# Patient Record
Sex: FEMALE | Race: WHITE | Employment: OTHER | URBAN - METROPOLITAN AREA
[De-identification: names, ages, dates, MRNs, and addresses within clinical notes are randomized per-mention and may not be internally consistent; named-entity substitution may affect disease eponyms.]

---

## 2018-10-06 ENCOUNTER — HOSPITAL ENCOUNTER (OUTPATIENT)
Age: 63
Discharge: HOME | End: 2018-10-06
Payer: COMMERCIAL

## 2018-10-06 LAB
ALBUMIN SERPL-MCNC: 4 G/DL (ref 3.5–5.2)
ALP BLD-CCNC: 153 U/L (ref 35–104)
ALT SERPL-CCNC: 26 U/L (ref 0–32)
ANION GAP SERPL CALCULATED.3IONS-SCNC: 12 MMOL/L (ref 7–16)
AST SERPL-CCNC: 19 U/L (ref 0–31)
BILIRUB SERPL-MCNC: <0.2 MG/DL (ref 0–1.2)
BUN BLDV-MCNC: 18 MG/DL (ref 8–23)
CALCIUM SERPL-MCNC: 9.3 MG/DL (ref 8.6–10.2)
CHLORIDE BLD-SCNC: 106 MMOL/L (ref 98–107)
CHOLESTEROL, FASTING: 167 MG/DL (ref 0–199)
CO2: 22 MMOL/L (ref 22–29)
CREAT SERPL-MCNC: 1 MG/DL (ref 0.5–1)
GFR AFRICAN AMERICAN: >60
GFR NON-AFRICAN AMERICAN: 56 ML/MIN/1.73
GLUCOSE BLD-MCNC: 285 MG/DL (ref 74–109)
HBA1C MFR BLD: 8.8 % (ref 4–5.6)
HDLC SERPL-MCNC: 34 MG/DL
LDL CHOLESTEROL CALCULATED: 86 MG/DL (ref 0–99)
POTASSIUM SERPL-SCNC: 5.3 MMOL/L (ref 3.5–5)
SODIUM BLD-SCNC: 140 MMOL/L (ref 132–146)
T4 FREE: 1.51 NG/DL (ref 0.93–1.7)
TOTAL PROTEIN: 6.9 G/DL (ref 6.4–8.3)
TRIGLYCERIDE, FASTING: 237 MG/DL (ref 0–149)
TSH SERPL DL<=0.05 MIU/L-ACNC: 0.35 UIU/ML (ref 0.27–4.2)
VLDLC SERPL CALC-MCNC: 47 MG/DL

## 2018-10-06 PROCEDURE — 83721 ASSAY OF BLOOD LIPOPROTEIN: CPT

## 2018-10-06 PROCEDURE — 80053 COMPREHEN METABOLIC PANEL: CPT

## 2018-10-06 PROCEDURE — 80061 LIPID PANEL: CPT

## 2018-10-06 PROCEDURE — 83036 HEMOGLOBIN GLYCOSYLATED A1C: CPT

## 2018-10-06 PROCEDURE — 36415 COLL VENOUS BLD VENIPUNCTURE: CPT

## 2018-10-06 PROCEDURE — 84443 ASSAY THYROID STIM HORMONE: CPT

## 2018-10-06 PROCEDURE — 84439 ASSAY OF FREE THYROXINE: CPT

## 2018-10-09 LAB
Lab: NORMAL
REPORT: NORMAL
THIS TEST SENT TO: NORMAL

## 2019-01-12 ENCOUNTER — HOSPITAL ENCOUNTER (OUTPATIENT)
Age: 64
Discharge: HOME | End: 2019-01-12
Payer: COMMERCIAL

## 2019-01-12 LAB
ALBUMIN SERPL-MCNC: 4.4 G/DL (ref 3.5–5.2)
ALP BLD-CCNC: 128 U/L (ref 35–104)
ALT SERPL-CCNC: 22 U/L (ref 0–32)
ANION GAP SERPL CALCULATED.3IONS-SCNC: 8 MMOL/L (ref 7–16)
AST SERPL-CCNC: 14 U/L (ref 0–31)
BILIRUB SERPL-MCNC: 0.3 MG/DL (ref 0–1.2)
BUN BLDV-MCNC: 22 MG/DL (ref 8–23)
CALCIUM SERPL-MCNC: 9.6 MG/DL (ref 8.6–10.2)
CHLORIDE BLD-SCNC: 106 MMOL/L (ref 98–107)
CHOLESTEROL, TOTAL: 184 MG/DL (ref 0–199)
CO2: 25 MMOL/L (ref 22–29)
CREAT SERPL-MCNC: 1 MG/DL (ref 0.5–1)
GFR AFRICAN AMERICAN: >60
GFR NON-AFRICAN AMERICAN: 56 ML/MIN/1.73
GLUCOSE BLD-MCNC: 261 MG/DL (ref 74–99)
HBA1C MFR BLD: 8.6 % (ref 4–5.6)
HDLC SERPL-MCNC: 42 MG/DL
LDL CHOLESTEROL CALCULATED: 102 MG/DL (ref 0–99)
POTASSIUM SERPL-SCNC: 5.6 MMOL/L (ref 3.5–5)
SODIUM BLD-SCNC: 139 MMOL/L (ref 132–146)
T4 FREE: 1.24 NG/DL (ref 0.93–1.7)
TOTAL PROTEIN: 6.8 G/DL (ref 6.4–8.3)
TRIGL SERPL-MCNC: 200 MG/DL (ref 0–149)
TSH SERPL DL<=0.05 MIU/L-ACNC: 1.83 UIU/ML (ref 0.27–4.2)
VLDLC SERPL CALC-MCNC: 40 MG/DL

## 2019-01-12 PROCEDURE — 84443 ASSAY THYROID STIM HORMONE: CPT

## 2019-01-12 PROCEDURE — 36415 COLL VENOUS BLD VENIPUNCTURE: CPT

## 2019-01-12 PROCEDURE — 80053 COMPREHEN METABOLIC PANEL: CPT

## 2019-01-12 PROCEDURE — 83721 ASSAY OF BLOOD LIPOPROTEIN: CPT

## 2019-01-12 PROCEDURE — 83036 HEMOGLOBIN GLYCOSYLATED A1C: CPT

## 2019-01-12 PROCEDURE — 80061 LIPID PANEL: CPT

## 2019-01-12 PROCEDURE — 84439 ASSAY OF FREE THYROXINE: CPT

## 2019-01-16 LAB
Lab: NORMAL
REPORT: NORMAL
THIS TEST SENT TO: NORMAL

## 2019-04-04 VITALS
BODY MASS INDEX: 32.96 KG/M2 | HEIGHT: 63 IN | DIASTOLIC BLOOD PRESSURE: 80 MMHG | SYSTOLIC BLOOD PRESSURE: 122 MMHG | HEART RATE: 76 BPM | WEIGHT: 186 LBS

## 2019-04-04 RX ORDER — ATORVASTATIN CALCIUM 40 MG/1
40 TABLET, FILM COATED ORAL DAILY
COMMUNITY
End: 2019-08-21 | Stop reason: SDUPTHER

## 2019-04-04 RX ORDER — GLIMEPIRIDE 4 MG/1
4 TABLET ORAL
COMMUNITY

## 2019-04-04 RX ORDER — TRAMADOL HYDROCHLORIDE 50 MG/1
50 TABLET ORAL
COMMUNITY
End: 2019-06-25

## 2019-04-04 RX ORDER — CHOLECALCIFEROL (VITAMIN D3) 1250 MCG
1 CAPSULE ORAL WEEKLY
COMMUNITY
End: 2020-04-28

## 2019-04-04 RX ORDER — METOPROLOL SUCCINATE 50 MG/1
50 TABLET, EXTENDED RELEASE ORAL DAILY
COMMUNITY
End: 2019-06-25

## 2019-04-04 RX ORDER — FERROUS SULFATE 325(65) MG
325 TABLET ORAL DAILY
COMMUNITY
End: 2019-06-25

## 2019-04-04 RX ORDER — PIOGLITAZONEHYDROCHLORIDE 15 MG/1
15 TABLET ORAL NIGHTLY
COMMUNITY

## 2019-04-04 RX ORDER — FLUTICASONE PROPIONATE 50 MCG
2 SPRAY, SUSPENSION (ML) NASAL DAILY
COMMUNITY
End: 2020-04-28

## 2019-04-27 ENCOUNTER — HOSPITAL ENCOUNTER (OUTPATIENT)
Age: 64
Discharge: HOME | End: 2019-04-27
Payer: COMMERCIAL

## 2019-04-27 LAB
ALBUMIN SERPL-MCNC: 4.2 G/DL (ref 3.5–5.2)
ALP BLD-CCNC: 127 U/L (ref 35–104)
ALT SERPL-CCNC: 25 U/L (ref 0–32)
ANION GAP SERPL CALCULATED.3IONS-SCNC: 13 MMOL/L (ref 7–16)
AST SERPL-CCNC: 18 U/L (ref 0–31)
BILIRUB SERPL-MCNC: 0.2 MG/DL (ref 0–1.2)
BUN BLDV-MCNC: 21 MG/DL (ref 8–23)
CALCIUM SERPL-MCNC: 9.3 MG/DL (ref 8.6–10.2)
CHLORIDE BLD-SCNC: 105 MMOL/L (ref 98–107)
CHOLESTEROL, FASTING: 175 MG/DL (ref 0–199)
CO2: 24 MMOL/L (ref 22–29)
CREAT SERPL-MCNC: 1.1 MG/DL (ref 0.5–1)
GFR AFRICAN AMERICAN: >60
GFR NON-AFRICAN AMERICAN: 50 ML/MIN/1.73
GLUCOSE BLD-MCNC: 161 MG/DL (ref 74–99)
HBA1C MFR BLD: 7.3 % (ref 4–5.6)
HDLC SERPL-MCNC: 47 MG/DL
LDL CHOLESTEROL CALCULATED: 98 MG/DL (ref 0–99)
POTASSIUM SERPL-SCNC: 4.9 MMOL/L (ref 3.5–5)
SODIUM BLD-SCNC: 142 MMOL/L (ref 132–146)
T4 FREE: 1.3 NG/DL (ref 0.93–1.7)
TOTAL PROTEIN: 6.8 G/DL (ref 6.4–8.3)
TRIGLYCERIDE, FASTING: 152 MG/DL (ref 0–149)
TSH SERPL DL<=0.05 MIU/L-ACNC: 0.34 UIU/ML (ref 0.27–4.2)
VLDLC SERPL CALC-MCNC: 30 MG/DL

## 2019-04-27 PROCEDURE — 84443 ASSAY THYROID STIM HORMONE: CPT

## 2019-04-27 PROCEDURE — 83036 HEMOGLOBIN GLYCOSYLATED A1C: CPT

## 2019-04-27 PROCEDURE — 80053 COMPREHEN METABOLIC PANEL: CPT

## 2019-04-27 PROCEDURE — 80061 LIPID PANEL: CPT

## 2019-04-27 PROCEDURE — 84439 ASSAY OF FREE THYROXINE: CPT

## 2019-04-27 PROCEDURE — 36415 COLL VENOUS BLD VENIPUNCTURE: CPT

## 2019-06-22 PROBLEM — E11.9 TYPE 2 DIABETES MELLITUS WITHOUT COMPLICATION (HCC): Status: ACTIVE | Noted: 2019-06-22

## 2019-06-22 PROBLEM — K21.9 GERD (GASTROESOPHAGEAL REFLUX DISEASE): Status: ACTIVE | Noted: 2019-06-22

## 2019-06-22 PROBLEM — E78.5 HYPERLIPIDEMIA: Status: ACTIVE | Noted: 2019-06-22

## 2019-06-22 NOTE — PROGRESS NOTES
19  Sharad Green : 1955 Sex: female  Age: 58 y.o. Chief Complaint   Patient presents with    Pre-op Exam       She is scheduled with Dr. Monique Holley to have total knee arthroplasty on 2019. This will be done at [facility].  Patient had previous knee arthroplasty and did well. She did have some problems with sedation after anesthesia. She has not had these problems previously. Patient denied nausea or vomiting after the surgery. Patient does easily exceed 4 metabolic equivalents of activity on a regular basis. With this activity she is not having symptoms of chest pain or chest pressure. She denies shortness of breath. She denies nausea, vomiting, diaphoresis or shortness of breath. Patient does not have any other anginal equivalents with activity. Most recent hemoglobin A1c was 7.3. Her perioperative diabetes medication is being handled by her endocrinologist Dr. Angela Monaco. Patient's blood pressure is well controlled here. She is not having tachycardia.       Review of Systems  PMH:  Health Maintenance:  Mammogram Screening - (2014)-slip given 2019  Bone Density Test Screening - (2011)  Sharadmarco Green  1955 Page #2  EGD - 2002 GREEN  2005 LUIS  Colonoscopy - () wnl  Mammogram - () wnl  Pneumonia Vaccination -   Influenza Vaccination - ()  Tdap - (2011)  Eye Exams - YEARLY MARISKI  Hepatitis B Series - YEARS AGO  Pelvic/Pap Exam - (2012) pap & hpv neg  Bone Density Test - (2011) normal but low normal bone density  Stress Test - (3/2016) LEXISCAN-NORMAL  Medical Problems:  Type 2 Diabetes - DIET CONTROLLED   BEGAN MEDS  COROLLO  Hypothyroidism -   Palpitations - FULL EVALUATION KALYAN - BEGAN METOPROLOL  STRESS TEST - ABNORMAL  HEART CATH  NORMAL [facility]  GERD - EGD  GREEN  EGD  LUIS- REFERRED BACK   Hyperlipidemia -  BEGAN STATINS  Seasonal Allergies - (2011) BEGAN FLONASE  Recurrent Sinusitis - NASAL SWAB TAKEN, MAY NEED IMMUNGLOBULIN STUDIES  Anxiety - (6/26/2013) BEGAN PAXIL  Depression, Reflux Disease  Osteoarthritis - (2017) KNEES- REFERRED TO BRIGIDO  Insomnia - (6/26/2013) BEGAN PAXIL-GOOD RESPONSE 7/31/13  Right Breast Swelling And Pain - NEGATIVE DIAGNOSTIC MAMMOGRAM/DR ARORA  Nausea/Vomiting/Diarrhea - PROBABLE SE FROM BYDUREON  Chest Pain - (2/17/2016) EKG/STRESS TEST/CHANGE TO METOPROLOL SUCCINATE  Kidney Disease - (2/1/2017) stable Cr 1.1 4/2019  Post-Op Anemia - (7/2017)  Tinnitus - REFERRED TO KATHERYN  Hearing Loss - (3/14/2018) KATHERYN  Surgical Hx:  Tubal Ligation  Endometrieosis - (1986) laparoscopic  Cholecystectomy - (1/2012)  Reviewed, no changes. FH:  Father:  Liver Cancer. Mother:  Esophagus Cancer, Non Insulin Dependent Diabetes, Thyroid Disease. Sister 1:  Insulin Dependent Diabetes. Reviewed, no changes. SH:  Marital: Legal Status: . Personal Habits: Cigarette Use: Former Cigarette Smoker 2 Packs Daily, Pack Years - 27, Quit -  18. Alcohol: Never used alcohol. Daily Caffeine: Consumes on average 1 soda per day. Exercise  Type: Walks 3 times a week. Reviewed, no changes. Date: 02/20/2019  Was the patient queried about smoking behavior? Yes   Does the patient currently smoke? Smoking: Patient is a former smoker.       Current Outpatient Medications:     ONETOUCH VERIO strip, , Disp: , Rfl:     ONETOUCH DELICA LANCETS 42W MISC, , Disp: , Rfl:     glimepiride (AMARYL) 4 MG tablet, Take 4 mg by mouth 2 times daily (before meals), Disp: , Rfl:     atorvastatin (LIPITOR) 40 MG tablet, Take 40 mg by mouth daily, Disp: , Rfl:     metFORMIN (GLUCOPHAGE) 1000 MG tablet, Take 1,000 mg by mouth 2 times daily (with meals), Disp: , Rfl:     Empagliflozin-Linagliptin (GLYXAMBI) 25-5 MG TABS, Take 1 tablet by mouth daily, Disp: , Rfl:     Cholecalciferol (VITAMIN D3) 40569 units CAPS, Take 1 capsule by mouth once a week, Disp: , Rfl:     mometasone [facility] HFA) 100 MCG/ACT AERO inhaler, Inhale 1 puff into the lungs 2 times daily, Disp: , Rfl:     fluticasone (FLONASE) 50 MCG/ACT nasal spray, 2 sprays by Each Nare route daily, Disp: , Rfl:     pioglitazone (ACTOS) 15 MG tablet, Take 15 mg by mouth daily, Disp: , Rfl:     metoprolol (LOPRESSOR) 50 MG tablet, , Disp: , Rfl:     PARoxetine (PAXIL) 40 MG tablet, 40 mg , Disp: , Rfl:     levothyroxine (SYNTHROID) 150 MCG tablet, Take 150 mcg by mouth Daily , Disp: , Rfl:     esomeprazole (NEXIUM) 40 MG capsule, Take 40 mg by mouth every morning (before breakfast). , Disp: , Rfl:     Calcium Carb-Cholecalciferol (CALCIUM + D3 PO), Take  by mouth., Disp: , Rfl:   Allergies   Allergen Reactions    Azithromycin     Pcn [Penicillins] Rash    Sulfa Antibiotics Rash    Zyrtec [Cetirizine] Rash       Past Medical History:   Diagnosis Date    Acid reflux disease     Anxiety     began paxil 2013    Breast swelling     right - neg diagnostic mammo    Chest pain     Depression     Endometriosis 1986    laparoscopic    GERD (gastroesophageal reflux disease)     Hearing loss     Hyperlipidemia     Hypothyroidism     Insomnia     Kidney disease     Nausea vomiting and diarrhea     Osteoarthritis 6/8/2011     ref to Jaime    Palpitations     Postoperative anemia     Recurrent sinusitis     Seasonal allergies     Tinnitus     Type 2 diabetes mellitus without complication (HCC)     Type II or unspecified type diabetes mellitus without mention of complication, not stated as uncontrolled      Past Surgical History:   Procedure Laterality Date    CARDIAC CATHETERIZATION      CHOLECYSTECTOMY  1/2012    COLONOSCOPY  2006    wnl    KNEE SURGERY      TUBAL LIGATION      UPPER GASTROINTESTINAL ENDOSCOPY       Family History   Problem Relation Age of Onset    Cancer Mother         esophageal    Diabetes Mother     Thyroid Disease Mother     Cancer Father         liver    Diabetes Sister Social History     Socioeconomic History    Marital status:      Spouse name: Not on file    Number of children: Not on file    Years of education: Not on file    Highest education level: Not on file   Occupational History    Not on file   Social Needs    Financial resource strain: Not on file    Food insecurity:     Worry: Not on file     Inability: Not on file    Transportation needs:     Medical: Not on file     Non-medical: Not on file   Tobacco Use    Smoking status: Former Smoker     Packs/day: 2.00     Years: 30.00     Pack years: 60.00     Start date:      Last attempt to quit: 1995     Years since quittin.6    Smokeless tobacco: Never Used   Substance and Sexual Activity    Alcohol use: Not Currently     Comment: rare    Drug use: Not on file    Sexual activity: Not on file   Lifestyle    Physical activity:     Days per week: Not on file     Minutes per session: Not on file    Stress: Not on file   Relationships    Social connections:     Talks on phone: Not on file     Gets together: Not on file     Attends Druze service: Not on file     Active member of club or organization: Not on file     Attends meetings of clubs or organizations: Not on file     Relationship status: Not on file    Intimate partner violence:     Fear of current or ex partner: Not on file     Emotionally abused: Not on file     Physically abused: Not on file     Forced sexual activity: Not on file   Other Topics Concern    Not on file   Social History Narrative    Not on file       Vitals:    19 1142   BP: 124/60   Pulse: 96   SpO2: 95%   Weight: 192 lb (87.1 kg)   Height: 5' 3\" (1.6 m)       Physical Exam  Objective    Exam:  Const: Appears healthy, well developed and well nourished. Appears obese. Eyes: EOMI in both eyes. PERRL. ENMT: External ears WNL. TM's intact and middle ear well aerated. External nose WNL. Moistness  and normal color of the nasal mucosae.  Septum is in the midline. Multiple missing teeth. Gums  appear healthy. Posterior pharynx shows clear postnasal drip, but no exudate, irritation or redness. Neck: Supple and symmetric. Palpation reveals no adenopathy. No masses appreciated. Thyroid  exhibits no nodule or thyromegaly. No JVD. Resp: Respirations are unlabored. Respiration rate is normal. Auscultate mildly decreased airflow. No  rales, rhonchi or wheezes appreciated over the lungs bilaterally. CV: Rhythm is regular. S1 is normal. S2 is normal. Carotids: no bruits. Abdominal aorta: not  palpable. Pedal pulses: 2+ and equal bilaterally. Extremities: No clubbing, cyanosis or edema. Abdomen: Bowel sounds are normoactive. Palpation reveals softness, with no CVA tenderness,  distension, organomegaly, rebound tenderness or tenderness. No abdominal masses palpable. No  palpable hepatosplenomegaly. Musculo: Walks with a normal gait. Upper Extremities: Pain especially with external rotation of the left  shoulder. No discomfort with palpation over the proximal muscles of the shoulder or hip girdles. Lower  Extremities: DJD changes of the lower extremities. Skin: Warm and dry without rash. Neuro: Alert and oriented x3. Mood is normal. Affect is normal. Speech is articulate and fluent. Reflexes: DTR's are intact bilaterally. Psych: Patient's attitude is cooperative. Patient's affect is appropriate. Judgement is realistic. Insight  is appropriate. Assessment and Plan:  Krysta Sumner was seen today for pre-op exam.    Diagnoses and all orders for this visit:    Gastroesophageal reflux disease without esophagitis    Type 2 diabetes mellitus without complication, without long-term current use of insulin ([facility] [address]  -     Cancel: Urinalysis with Microscopic; Future  -     POCT Urinalysis no Micro    Primary osteoarthritis involving multiple joints  -     EKG 12 lead; Future  -     MRSA SCREENING CULTURE ONLY; Future  -     Cancel: Urinalysis with Microscopic;  Future  -     EKG 12 lead    Mixed hyperlipidemia    The patient had an EKG here indicating no change from her previous tracing of 2017. MRSA screen was completed along with urinalysis. Patient is medically optimized for surgery. Forms are completed. Return in about 4 months (around 10/25/2019).       Seen By:  Patricia Werner MD

## 2019-06-25 ENCOUNTER — OFFICE VISIT (OUTPATIENT)
Dept: FAMILY MEDICINE CLINIC | Age: 64
End: 2019-06-25
Payer: COMMERCIAL

## 2019-06-25 ENCOUNTER — HOSPITAL ENCOUNTER (OUTPATIENT)
Age: 64
Discharge: HOME | End: 2019-06-27
Payer: COMMERCIAL

## 2019-06-25 VITALS
HEIGHT: 63 IN | OXYGEN SATURATION: 95 % | HEART RATE: 96 BPM | DIASTOLIC BLOOD PRESSURE: 60 MMHG | SYSTOLIC BLOOD PRESSURE: 124 MMHG | WEIGHT: 192 LBS | BODY MASS INDEX: 34.02 KG/M2

## 2019-06-25 DIAGNOSIS — K21.9 GASTROESOPHAGEAL REFLUX DISEASE WITHOUT ESOPHAGITIS: ICD-10-CM

## 2019-06-25 DIAGNOSIS — M15.9 PRIMARY OSTEOARTHRITIS INVOLVING MULTIPLE JOINTS: ICD-10-CM

## 2019-06-25 DIAGNOSIS — E11.9 TYPE 2 DIABETES MELLITUS WITHOUT COMPLICATION, WITHOUT LONG-TERM CURRENT USE OF INSULIN (HCC): ICD-10-CM

## 2019-06-25 DIAGNOSIS — E78.2 MIXED HYPERLIPIDEMIA: ICD-10-CM

## 2019-06-25 LAB
BILIRUBIN, POC: NORMAL
BLOOD URINE, POC: NORMAL
CLARITY, POC: YELLOW
COLOR, POC: CLEAR
GLUCOSE URINE, POC: 1000
KETONES, POC: NORMAL
LEUKOCYTE EST, POC: NORMAL
NITRITE, POC: NORMAL
PH, POC: 5.5
PROTEIN, POC: NORMAL
SPECIFIC GRAVITY, POC: 1.02
UROBILINOGEN, POC: 0.2

## 2019-06-25 PROCEDURE — 93000 ELECTROCARDIOGRAM COMPLETE: CPT | Performed by: INTERNAL MEDICINE

## 2019-06-25 PROCEDURE — 87081 CULTURE SCREEN ONLY: CPT

## 2019-06-25 PROCEDURE — 99214 OFFICE O/P EST MOD 30 MIN: CPT | Performed by: INTERNAL MEDICINE

## 2019-06-25 PROCEDURE — 81002 URINALYSIS NONAUTO W/O SCOPE: CPT | Performed by: INTERNAL MEDICINE

## 2019-06-25 RX ORDER — BLOOD SUGAR DIAGNOSTIC
STRIP MISCELLANEOUS
COMMUNITY
Start: 2019-06-22 | End: 2020-04-28

## 2019-06-25 RX ORDER — LANCETS 33 GAUGE
EACH MISCELLANEOUS
COMMUNITY
Start: 2019-06-22 | End: 2020-04-28

## 2019-06-25 RX ORDER — ERGOCALCIFEROL 1.25 MG/1
CAPSULE ORAL
COMMUNITY
Start: 2019-04-26 | End: 2019-06-25

## 2019-06-27 LAB — MRSA CULTURE ONLY: NORMAL

## 2019-07-12 ENCOUNTER — HOSPITAL ENCOUNTER (OUTPATIENT)
Age: 64
Discharge: HOME | End: 2019-07-14
Payer: COMMERCIAL

## 2019-07-12 ENCOUNTER — HOSPITAL ENCOUNTER (OUTPATIENT)
Age: 64
Discharge: HOME | End: 2019-07-14

## 2019-07-12 LAB
ABO/RH: NORMAL
ANION GAP SERPL CALCULATED.3IONS-SCNC: 13 MMOL/L (ref 7–16)
ANTIBODY SCREEN: NORMAL
BUN BLDV-MCNC: 20 MG/DL (ref 8–23)
CALCIUM SERPL-MCNC: 10 MG/DL (ref 8.6–10.2)
CHLORIDE BLD-SCNC: 104 MMOL/L (ref 98–107)
CO2: 24 MMOL/L (ref 22–29)
CREAT SERPL-MCNC: 1 MG/DL (ref 0.5–1)
GFR AFRICAN AMERICAN: >60
GFR NON-AFRICAN AMERICAN: 56 ML/MIN/1.73
GLUCOSE BLD-MCNC: 187 MG/DL (ref 74–99)
HCT VFR BLD CALC: 41.6 % (ref 34–48)
HEMOGLOBIN: 12.8 G/DL (ref 11.5–15.5)
MCH RBC QN AUTO: 27.2 PG (ref 26–35)
MCHC RBC AUTO-ENTMCNC: 30.8 % (ref 32–34.5)
MCV RBC AUTO: 88.5 FL (ref 80–99.9)
PDW BLD-RTO: 16.2 FL (ref 11.5–15)
PLATELET # BLD: 288 E9/L (ref 130–450)
PMV BLD AUTO: 11.8 FL (ref 7–12)
POTASSIUM SERPL-SCNC: 5 MMOL/L (ref 3.5–5)
RBC # BLD: 4.7 E12/L (ref 3.5–5.5)
SODIUM BLD-SCNC: 141 MMOL/L (ref 132–146)
WBC # BLD: 6.4 E9/L (ref 4.5–11.5)

## 2019-07-12 PROCEDURE — 86850 RBC ANTIBODY SCREEN: CPT

## 2019-07-12 PROCEDURE — 87081 CULTURE SCREEN ONLY: CPT

## 2019-07-12 PROCEDURE — 86900 BLOOD TYPING SEROLOGIC ABO: CPT

## 2019-07-12 PROCEDURE — 86901 BLOOD TYPING SEROLOGIC RH(D): CPT

## 2019-07-12 PROCEDURE — 80048 BASIC METABOLIC PNL TOTAL CA: CPT

## 2019-07-12 PROCEDURE — 87088 URINE BACTERIA CULTURE: CPT

## 2019-07-12 PROCEDURE — 85027 COMPLETE CBC AUTOMATED: CPT

## 2019-07-14 LAB
MRSA CULTURE ONLY: NORMAL
URINE CULTURE, ROUTINE: NORMAL

## 2019-07-24 ENCOUNTER — HOSPITAL ENCOUNTER (OUTPATIENT)
Age: 64
Discharge: HOME | End: 2019-07-24
Payer: COMMERCIAL

## 2019-07-24 ENCOUNTER — OFFICE VISIT (OUTPATIENT)
Dept: FAMILY MEDICINE CLINIC | Age: 64
End: 2019-07-24
Payer: COMMERCIAL

## 2019-07-24 VITALS
OXYGEN SATURATION: 98 % | HEART RATE: 111 BPM | WEIGHT: 189 LBS | HEIGHT: 63 IN | DIASTOLIC BLOOD PRESSURE: 80 MMHG | SYSTOLIC BLOOD PRESSURE: 122 MMHG | BODY MASS INDEX: 33.49 KG/M2

## 2019-07-24 DIAGNOSIS — F32.9 REACTIVE DEPRESSION: ICD-10-CM

## 2019-07-24 DIAGNOSIS — R50.9 FEVER, UNSPECIFIED FEVER CAUSE: Primary | ICD-10-CM

## 2019-07-24 DIAGNOSIS — R50.9 FEVER, UNSPECIFIED FEVER CAUSE: ICD-10-CM

## 2019-07-24 LAB
ALBUMIN SERPL-MCNC: 4.5 G/DL (ref 3.5–5.2)
ALP BLD-CCNC: 138 U/L (ref 35–104)
ALT SERPL-CCNC: 28 U/L (ref 0–32)
ANION GAP SERPL CALCULATED.3IONS-SCNC: 13 MMOL/L (ref 7–16)
AST SERPL-CCNC: 19 U/L (ref 0–31)
BILIRUB SERPL-MCNC: 0.3 MG/DL (ref 0–1.2)
BUN BLDV-MCNC: 18 MG/DL (ref 8–23)
CALCIUM SERPL-MCNC: 10.1 MG/DL (ref 8.6–10.2)
CHLORIDE BLD-SCNC: 106 MMOL/L (ref 98–107)
CO2: 23 MMOL/L (ref 22–29)
CREAT SERPL-MCNC: 1 MG/DL (ref 0.5–1)
GFR AFRICAN AMERICAN: >60
GFR NON-AFRICAN AMERICAN: 56 ML/MIN/1.73
GLUCOSE BLD-MCNC: 161 MG/DL (ref 74–99)
HCT VFR BLD CALC: 41.7 % (ref 34–48)
HEMOGLOBIN: 13.2 G/DL (ref 11.5–15.5)
MCH RBC QN AUTO: 27.8 PG (ref 26–35)
MCHC RBC AUTO-ENTMCNC: 31.7 % (ref 32–34.5)
MCV RBC AUTO: 87.8 FL (ref 80–99.9)
PDW BLD-RTO: 16.3 FL (ref 11.5–15)
PLATELET # BLD: 296 E9/L (ref 130–450)
PMV BLD AUTO: 11 FL (ref 7–12)
POTASSIUM SERPL-SCNC: 4.5 MMOL/L (ref 3.5–5)
RBC # BLD: 4.75 E12/L (ref 3.5–5.5)
SEDIMENTATION RATE, ERYTHROCYTE: 10 MM/HR (ref 0–20)
SODIUM BLD-SCNC: 142 MMOL/L (ref 132–146)
TOTAL PROTEIN: 7.4 G/DL (ref 6.4–8.3)
WBC # BLD: 7.3 E9/L (ref 4.5–11.5)

## 2019-07-24 PROCEDURE — 85027 COMPLETE CBC AUTOMATED: CPT

## 2019-07-24 PROCEDURE — 85651 RBC SED RATE NONAUTOMATED: CPT

## 2019-07-24 PROCEDURE — 36415 COLL VENOUS BLD VENIPUNCTURE: CPT

## 2019-07-24 PROCEDURE — 99214 OFFICE O/P EST MOD 30 MIN: CPT | Performed by: INTERNAL MEDICINE

## 2019-07-24 PROCEDURE — 80053 COMPREHEN METABOLIC PANEL: CPT

## 2019-07-24 RX ORDER — PAROXETINE HYDROCHLORIDE 40 MG/1
40 TABLET, FILM COATED ORAL EVERY MORNING
Qty: 30 TABLET | Refills: 1 | Status: SHIPPED | OUTPATIENT
Start: 2019-07-24 | End: 2019-08-21 | Stop reason: SDUPTHER

## 2019-07-25 DIAGNOSIS — K21.9 GASTROESOPHAGEAL REFLUX DISEASE WITHOUT ESOPHAGITIS: Primary | ICD-10-CM

## 2019-07-31 ENCOUNTER — HOSPITAL ENCOUNTER (OUTPATIENT)
Age: 64
Discharge: HOME | End: 2019-08-02
Payer: COMMERCIAL

## 2019-07-31 DIAGNOSIS — K21.9 GASTROESOPHAGEAL REFLUX DISEASE WITHOUT ESOPHAGITIS: ICD-10-CM

## 2019-07-31 LAB
IRON SATURATION: 12 % (ref 15–50)
IRON: 51 MCG/DL (ref 37–145)
TOTAL IRON BINDING CAPACITY: 413 MCG/DL (ref 250–450)

## 2019-07-31 PROCEDURE — 36415 COLL VENOUS BLD VENIPUNCTURE: CPT

## 2019-07-31 PROCEDURE — 83550 IRON BINDING TEST: CPT

## 2019-07-31 PROCEDURE — 83540 ASSAY OF IRON: CPT

## 2019-08-21 ENCOUNTER — OFFICE VISIT (OUTPATIENT)
Dept: FAMILY MEDICINE CLINIC | Age: 64
End: 2019-08-21
Payer: COMMERCIAL

## 2019-08-21 VITALS
BODY MASS INDEX: 34.55 KG/M2 | DIASTOLIC BLOOD PRESSURE: 74 MMHG | WEIGHT: 195 LBS | SYSTOLIC BLOOD PRESSURE: 122 MMHG | OXYGEN SATURATION: 97 % | HEART RATE: 75 BPM | TEMPERATURE: 97.7 F | HEIGHT: 63 IN

## 2019-08-21 DIAGNOSIS — E11.9 TYPE 2 DIABETES MELLITUS WITHOUT COMPLICATION, WITHOUT LONG-TERM CURRENT USE OF INSULIN (HCC): Primary | ICD-10-CM

## 2019-08-21 DIAGNOSIS — F32.9 REACTIVE DEPRESSION: ICD-10-CM

## 2019-08-21 DIAGNOSIS — M15.9 PRIMARY OSTEOARTHRITIS INVOLVING MULTIPLE JOINTS: ICD-10-CM

## 2019-08-21 DIAGNOSIS — E03.9 ACQUIRED HYPOTHYROIDISM: ICD-10-CM

## 2019-08-21 PROCEDURE — 99213 OFFICE O/P EST LOW 20 MIN: CPT | Performed by: INTERNAL MEDICINE

## 2019-08-21 RX ORDER — LANOLIN ALCOHOL/MO/W.PET/CERES
325 CREAM (GRAM) TOPICAL
Status: ON HOLD | COMMUNITY
End: 2020-09-04 | Stop reason: HOSPADM

## 2019-08-21 RX ORDER — NAPROXEN 250 MG/1
500 TABLET ORAL 2 TIMES DAILY WITH MEALS
COMMUNITY
End: 2019-12-11 | Stop reason: ALTCHOICE

## 2019-08-21 RX ORDER — METOPROLOL TARTRATE 50 MG/1
50 TABLET, FILM COATED ORAL DAILY
Qty: 90 TABLET | Refills: 1 | Status: SHIPPED | OUTPATIENT
Start: 2019-08-21 | End: 2020-04-13

## 2019-08-21 RX ORDER — PAROXETINE HYDROCHLORIDE 40 MG/1
40 TABLET, FILM COATED ORAL EVERY MORNING
Qty: 90 TABLET | Refills: 1 | Status: SHIPPED | OUTPATIENT
Start: 2019-08-21 | End: 2020-04-13

## 2019-08-21 RX ORDER — ESOMEPRAZOLE MAGNESIUM 40 MG/1
40 CAPSULE, DELAYED RELEASE ORAL
Qty: 90 CAPSULE | Refills: 1 | Status: SHIPPED | OUTPATIENT
Start: 2019-08-21 | End: 2020-05-04

## 2019-08-21 RX ORDER — ATORVASTATIN CALCIUM 40 MG/1
40 TABLET, FILM COATED ORAL DAILY
Qty: 90 TABLET | Refills: 1 | Status: SHIPPED | OUTPATIENT
Start: 2019-08-21 | End: 2020-05-04

## 2019-08-21 RX ORDER — MULTIVIT WITH MINERALS/LUTEIN
250 TABLET ORAL
COMMUNITY
End: 2022-02-08 | Stop reason: CLARIF

## 2019-08-21 NOTE — PROGRESS NOTES
the patient currently smoke? Smoking: Patient is a former smoker. Objective  BP: 122/80 Pulse: 76 Ht: 63\" 5'3\" Ht cm: 160.0 Wt: 186lb Wt Prior: 187lb as of 10/31/18 Wt Dif: -1lb Wt  k.370 Wt kg Prior: 84.823 as of 10/31/18 Wt kg Dif: -0.453 BMI: 32.9 BSA: 1.87  Exam:  Const: Appears healthy, well developed and well nourished. Appears obese. Eyes: EOMI in both eyes. PERRL. ENMT: External ears WNL. TM's intact and middle ear well aerated. External nose WNL. Moistness  and normal color of the nasal mucosae. Septum is in the midline. Multiple missing teeth. Gums  appear healthy. Posterior pharynx shows clear postnasal drip, but no exudate, irritation or redness. Neck: Supple and symmetric. Palpation reveals no adenopathy. No masses appreciated. Thyroid  exhibits no nodule or thyromegaly. No JVD. Resp: Respirations are unlabored. Respiration rate is normal. Auscultate mildly decreased airflow. No  rales, rhonchi or wheezes appreciated over the lungs bilaterally. CV: Rhythm is regular. S1 is normal. S2 is normal. Carotids: no bruits. Abdominal aorta: not  palpable. Pedal pulses: 2+ and equal bilaterally. Extremities: No clubbing, cyanosis or edema. Abdomen: Bowel sounds are normoactive. Palpation reveals softness, with no CVA tenderness,  distension, organomegaly, rebound tenderness or tenderness. No abdominal masses palpable. No  palpable hepatosplenomegaly. Musculo: Walks with a normal gait. Upper Extremities: Pain especially with external rotation of the left  shoulder. No discomfort with palpation over the proximal muscles of the shoulder or hip girdles. Lower  Extremities: DJD changes of the lower extremities. Skin: Erythema is much improved. Minimal amount of open area right buttock. Less than 1 cm across. No surrounding erythema or fluctuance. Neuro: Alert and oriented x3. Mood is normal. Affect is normal. Speech is articulate and fluent. Reflexes: DTR's are intact bilaterally.   Psych: Patient's

## 2019-08-31 ENCOUNTER — HOSPITAL ENCOUNTER (OUTPATIENT)
Age: 64
Discharge: HOME | End: 2019-08-31
Payer: COMMERCIAL

## 2019-08-31 LAB
ALBUMIN SERPL-MCNC: 4.4 G/DL (ref 3.5–5.2)
ALP BLD-CCNC: 130 U/L (ref 35–104)
ALT SERPL-CCNC: 21 U/L (ref 0–32)
ANION GAP SERPL CALCULATED.3IONS-SCNC: 13 MMOL/L (ref 7–16)
AST SERPL-CCNC: 16 U/L (ref 0–31)
BILIRUB SERPL-MCNC: 0.3 MG/DL (ref 0–1.2)
BUN BLDV-MCNC: 19 MG/DL (ref 8–23)
CALCIUM SERPL-MCNC: 9.5 MG/DL (ref 8.6–10.2)
CHLORIDE BLD-SCNC: 106 MMOL/L (ref 98–107)
CHOLESTEROL, FASTING: 144 MG/DL (ref 0–199)
CO2: 23 MMOL/L (ref 22–29)
CREAT SERPL-MCNC: 1 MG/DL (ref 0.5–1)
CREATININE URINE: 95 MG/DL (ref 29–226)
GFR AFRICAN AMERICAN: >60
GFR NON-AFRICAN AMERICAN: 56 ML/MIN/1.73
GLUCOSE BLD-MCNC: 210 MG/DL (ref 74–99)
HBA1C MFR BLD: 7.5 % (ref 4–5.6)
HDLC SERPL-MCNC: 39 MG/DL
LDL CHOLESTEROL CALCULATED: 79 MG/DL (ref 0–99)
MICROALBUMIN UR-MCNC: <12 MG/L
MICROALBUMIN/CREAT UR-RTO: ABNORMAL (ref 0–30)
POTASSIUM SERPL-SCNC: 5.2 MMOL/L (ref 3.5–5)
SODIUM BLD-SCNC: 142 MMOL/L (ref 132–146)
T4 FREE: 1.52 NG/DL (ref 0.93–1.7)
TOTAL PROTEIN: 7.1 G/DL (ref 6.4–8.3)
TRIGLYCERIDE, FASTING: 130 MG/DL (ref 0–149)
TSH SERPL DL<=0.05 MIU/L-ACNC: 0.98 UIU/ML (ref 0.27–4.2)
VLDLC SERPL CALC-MCNC: 26 MG/DL

## 2019-08-31 PROCEDURE — 80061 LIPID PANEL: CPT

## 2019-08-31 PROCEDURE — 84443 ASSAY THYROID STIM HORMONE: CPT

## 2019-08-31 PROCEDURE — 80053 COMPREHEN METABOLIC PANEL: CPT

## 2019-08-31 PROCEDURE — 83036 HEMOGLOBIN GLYCOSYLATED A1C: CPT

## 2019-08-31 PROCEDURE — 84439 ASSAY OF FREE THYROXINE: CPT

## 2019-08-31 PROCEDURE — 82570 ASSAY OF URINE CREATININE: CPT

## 2019-08-31 PROCEDURE — 36415 COLL VENOUS BLD VENIPUNCTURE: CPT

## 2019-08-31 PROCEDURE — 82044 UR ALBUMIN SEMIQUANTITATIVE: CPT

## 2019-09-13 ENCOUNTER — HOSPITAL ENCOUNTER (OUTPATIENT)
Age: 64
Discharge: HOME | End: 2019-09-15

## 2019-09-13 LAB
ABO/RH: NORMAL
ANTIBODY SCREEN: NORMAL

## 2019-09-13 PROCEDURE — 86850 RBC ANTIBODY SCREEN: CPT

## 2019-09-13 PROCEDURE — 86901 BLOOD TYPING SEROLOGIC RH(D): CPT

## 2019-09-13 PROCEDURE — 86900 BLOOD TYPING SEROLOGIC ABO: CPT

## 2019-09-14 ENCOUNTER — HOSPITAL ENCOUNTER (OUTPATIENT)
Age: 64
Discharge: HOME | End: 2019-09-16

## 2019-09-14 LAB
ANION GAP SERPL CALCULATED.3IONS-SCNC: 13 MMOL/L (ref 7–16)
BUN BLDV-MCNC: 17 MG/DL (ref 8–23)
CALCIUM SERPL-MCNC: 9.1 MG/DL (ref 8.6–10.2)
CHLORIDE BLD-SCNC: 109 MMOL/L (ref 98–107)
CO2: 22 MMOL/L (ref 22–29)
CREAT SERPL-MCNC: 0.8 MG/DL (ref 0.5–1)
GFR AFRICAN AMERICAN: >60
GFR NON-AFRICAN AMERICAN: >60 ML/MIN/1.73
GLUCOSE BLD-MCNC: 115 MG/DL (ref 74–99)
HCT VFR BLD CALC: 37.1 % (ref 34–48)
HEMOGLOBIN: 11 G/DL (ref 11.5–15.5)
MCH RBC QN AUTO: 27.9 PG (ref 26–35)
MCHC RBC AUTO-ENTMCNC: 29.6 % (ref 32–34.5)
MCV RBC AUTO: 94.2 FL (ref 80–99.9)
PDW BLD-RTO: 16.2 FL (ref 11.5–15)
PLATELET # BLD: 231 E9/L (ref 130–450)
PMV BLD AUTO: 11.5 FL (ref 7–12)
POTASSIUM SERPL-SCNC: 5.7 MMOL/L (ref 3.5–5)
RBC # BLD: 3.94 E12/L (ref 3.5–5.5)
SODIUM BLD-SCNC: 144 MMOL/L (ref 132–146)
WBC # BLD: 8.9 E9/L (ref 4.5–11.5)

## 2019-09-14 PROCEDURE — 85027 COMPLETE CBC AUTOMATED: CPT

## 2019-09-14 PROCEDURE — 80048 BASIC METABOLIC PNL TOTAL CA: CPT

## 2019-09-15 ENCOUNTER — HOSPITAL ENCOUNTER (OUTPATIENT)
Age: 64
Discharge: HOME | End: 2019-09-17

## 2019-09-15 LAB
ANION GAP SERPL CALCULATED.3IONS-SCNC: 17 MMOL/L (ref 7–16)
BUN BLDV-MCNC: 14 MG/DL (ref 8–23)
CALCIUM SERPL-MCNC: 9.4 MG/DL (ref 8.6–10.2)
CHLORIDE BLD-SCNC: 105 MMOL/L (ref 98–107)
CO2: 20 MMOL/L (ref 22–29)
CREAT SERPL-MCNC: 0.8 MG/DL (ref 0.5–1)
GFR AFRICAN AMERICAN: >60
GFR NON-AFRICAN AMERICAN: >60 ML/MIN/1.73
GLUCOSE BLD-MCNC: 114 MG/DL (ref 74–99)
HCT VFR BLD CALC: 35.7 % (ref 34–48)
HEMOGLOBIN: 10.8 G/DL (ref 11.5–15.5)
MCH RBC QN AUTO: 28 PG (ref 26–35)
MCHC RBC AUTO-ENTMCNC: 30.3 % (ref 32–34.5)
MCV RBC AUTO: 92.5 FL (ref 80–99.9)
PDW BLD-RTO: 16.8 FL (ref 11.5–15)
PLATELET # BLD: 250 E9/L (ref 130–450)
PMV BLD AUTO: 11.8 FL (ref 7–12)
POTASSIUM SERPL-SCNC: 4.6 MMOL/L (ref 3.5–5)
RBC # BLD: 3.86 E12/L (ref 3.5–5.5)
SODIUM BLD-SCNC: 142 MMOL/L (ref 132–146)
WBC # BLD: 11.9 E9/L (ref 4.5–11.5)

## 2019-09-15 PROCEDURE — 80048 BASIC METABOLIC PNL TOTAL CA: CPT

## 2019-09-15 PROCEDURE — 85027 COMPLETE CBC AUTOMATED: CPT

## 2019-12-11 ENCOUNTER — OFFICE VISIT (OUTPATIENT)
Dept: FAMILY MEDICINE CLINIC | Age: 64
End: 2019-12-11
Payer: MEDICARE

## 2019-12-11 ENCOUNTER — HOSPITAL ENCOUNTER (OUTPATIENT)
Dept: CT IMAGING | Age: 64
Discharge: HOME | End: 2019-12-13
Payer: MEDICARE

## 2019-12-11 ENCOUNTER — HOSPITAL ENCOUNTER (OUTPATIENT)
Age: 64
Discharge: HOME | End: 2019-12-11
Payer: MEDICARE

## 2019-12-11 VITALS
BODY MASS INDEX: 33.13 KG/M2 | SYSTOLIC BLOOD PRESSURE: 130 MMHG | WEIGHT: 187 LBS | HEART RATE: 90 BPM | OXYGEN SATURATION: 98 % | DIASTOLIC BLOOD PRESSURE: 80 MMHG

## 2019-12-11 DIAGNOSIS — K21.9 GASTROESOPHAGEAL REFLUX DISEASE WITHOUT ESOPHAGITIS: ICD-10-CM

## 2019-12-11 DIAGNOSIS — R19.7 DIARRHEA OF PRESUMED INFECTIOUS ORIGIN: ICD-10-CM

## 2019-12-11 DIAGNOSIS — R10.32 LLQ PAIN: Primary | ICD-10-CM

## 2019-12-11 DIAGNOSIS — R10.32 LLQ PAIN: ICD-10-CM

## 2019-12-11 DIAGNOSIS — E11.9 TYPE 2 DIABETES MELLITUS WITHOUT COMPLICATION, WITHOUT LONG-TERM CURRENT USE OF INSULIN (HCC): ICD-10-CM

## 2019-12-11 LAB
ALBUMIN SERPL-MCNC: 4.2 G/DL (ref 3.5–5.2)
ALP BLD-CCNC: 137 U/L (ref 35–104)
ALT SERPL-CCNC: 22 U/L (ref 0–32)
ANION GAP SERPL CALCULATED.3IONS-SCNC: 12 MMOL/L (ref 7–16)
AST SERPL-CCNC: 14 U/L (ref 0–31)
BILIRUB SERPL-MCNC: 0.2 MG/DL (ref 0–1.2)
BUN BLDV-MCNC: 17 MG/DL (ref 8–23)
CALCIUM SERPL-MCNC: 9.7 MG/DL (ref 8.6–10.2)
CHLORIDE BLD-SCNC: 106 MMOL/L (ref 98–107)
CO2: 23 MMOL/L (ref 22–29)
CREAT SERPL-MCNC: 0.8 MG/DL (ref 0.5–1)
GFR AFRICAN AMERICAN: >60
GFR NON-AFRICAN AMERICAN: >60 ML/MIN/1.73
GLUCOSE BLD-MCNC: 77 MG/DL (ref 74–99)
POTASSIUM SERPL-SCNC: 4.9 MMOL/L (ref 3.5–5)
SEDIMENTATION RATE, ERYTHROCYTE: 14 MM/HR (ref 0–20)
SODIUM BLD-SCNC: 141 MMOL/L (ref 132–146)
TOTAL PROTEIN: 7.1 G/DL (ref 6.4–8.3)

## 2019-12-11 PROCEDURE — 3017F COLORECTAL CA SCREEN DOC REV: CPT | Performed by: INTERNAL MEDICINE

## 2019-12-11 PROCEDURE — 2022F DILAT RTA XM EVC RTNOPTHY: CPT | Performed by: INTERNAL MEDICINE

## 2019-12-11 PROCEDURE — 4040F PNEUMOC VAC/ADMIN/RCVD: CPT | Performed by: INTERNAL MEDICINE

## 2019-12-11 PROCEDURE — G8484 FLU IMMUNIZE NO ADMIN: HCPCS | Performed by: INTERNAL MEDICINE

## 2019-12-11 PROCEDURE — 1090F PRES/ABSN URINE INCON ASSESS: CPT | Performed by: INTERNAL MEDICINE

## 2019-12-11 PROCEDURE — 6360000004 HC RX CONTRAST MEDICATION: Performed by: RADIOLOGY

## 2019-12-11 PROCEDURE — 1036F TOBACCO NON-USER: CPT | Performed by: INTERNAL MEDICINE

## 2019-12-11 PROCEDURE — 85651 RBC SED RATE NONAUTOMATED: CPT

## 2019-12-11 PROCEDURE — 36415 COLL VENOUS BLD VENIPUNCTURE: CPT

## 2019-12-11 PROCEDURE — 3045F PR MOST RECENT HEMOGLOBIN A1C LEVEL 7.0-9.0%: CPT | Performed by: INTERNAL MEDICINE

## 2019-12-11 PROCEDURE — 99214 OFFICE O/P EST MOD 30 MIN: CPT | Performed by: INTERNAL MEDICINE

## 2019-12-11 PROCEDURE — 1123F ACP DISCUSS/DSCN MKR DOCD: CPT | Performed by: INTERNAL MEDICINE

## 2019-12-11 PROCEDURE — 2580000003 HC RX 258: Performed by: RADIOLOGY

## 2019-12-11 PROCEDURE — G8427 DOCREV CUR MEDS BY ELIG CLIN: HCPCS | Performed by: INTERNAL MEDICINE

## 2019-12-11 PROCEDURE — G8417 CALC BMI ABV UP PARAM F/U: HCPCS | Performed by: INTERNAL MEDICINE

## 2019-12-11 PROCEDURE — G8400 PT W/DXA NO RESULTS DOC: HCPCS | Performed by: INTERNAL MEDICINE

## 2019-12-11 PROCEDURE — 74177 CT ABD & PELVIS W/CONTRAST: CPT

## 2019-12-11 PROCEDURE — 80053 COMPREHEN METABOLIC PANEL: CPT

## 2019-12-11 RX ORDER — METFORMIN HYDROCHLORIDE 500 MG/1
TABLET, EXTENDED RELEASE ORAL
Refills: 3 | COMMUNITY
Start: 2019-10-29

## 2019-12-11 RX ORDER — METRONIDAZOLE 500 MG/1
500 TABLET ORAL 3 TIMES DAILY
Qty: 30 TABLET | Refills: 0 | Status: SHIPPED | OUTPATIENT
Start: 2019-12-11 | End: 2019-12-21

## 2019-12-11 RX ORDER — SODIUM CHLORIDE 0.9 % (FLUSH) 0.9 %
10 SYRINGE (ML) INJECTION ONCE
Status: COMPLETED | OUTPATIENT
Start: 2019-12-11 | End: 2019-12-11

## 2019-12-11 RX ADMIN — IOHEXOL 50 ML: 240 INJECTION, SOLUTION INTRATHECAL; INTRAVASCULAR; INTRAVENOUS; ORAL at 16:56

## 2019-12-11 RX ADMIN — IOPAMIDOL 110 ML: 755 INJECTION, SOLUTION INTRAVENOUS at 16:56

## 2019-12-11 RX ADMIN — Medication 10 ML: at 16:56

## 2019-12-11 ASSESSMENT — PATIENT HEALTH QUESTIONNAIRE - PHQ9
1. LITTLE INTEREST OR PLEASURE IN DOING THINGS: 0
SUM OF ALL RESPONSES TO PHQ QUESTIONS 1-9: 0
SUM OF ALL RESPONSES TO PHQ QUESTIONS 1-9: 0
SUM OF ALL RESPONSES TO PHQ9 QUESTIONS 1 & 2: 0
2. FEELING DOWN, DEPRESSED OR HOPELESS: 0

## 2019-12-12 ENCOUNTER — HOSPITAL ENCOUNTER (OUTPATIENT)
Age: 64
Discharge: HOME | End: 2019-12-12
Payer: MEDICARE

## 2019-12-12 DIAGNOSIS — R10.32 LLQ PAIN: ICD-10-CM

## 2019-12-12 DIAGNOSIS — R19.7 DIARRHEA OF PRESUMED INFECTIOUS ORIGIN: ICD-10-CM

## 2019-12-12 PROCEDURE — 89055 LEUKOCYTE ASSESSMENT FECAL: CPT

## 2019-12-12 PROCEDURE — 87045 FECES CULTURE AEROBIC BACT: CPT

## 2019-12-13 LAB — WHITE BLOOD CELLS (WBC), STOOL: NORMAL

## 2019-12-14 LAB — CULTURE, STOOL 2: NORMAL

## 2019-12-16 ENCOUNTER — TELEPHONE (OUTPATIENT)
Dept: FAMILY MEDICINE CLINIC | Age: 64
End: 2019-12-16

## 2020-04-13 RX ORDER — METOPROLOL TARTRATE 50 MG/1
TABLET, FILM COATED ORAL
Qty: 90 TABLET | Refills: 0 | Status: SHIPPED
Start: 2020-04-13 | End: 2020-07-13

## 2020-04-13 RX ORDER — PAROXETINE HYDROCHLORIDE 40 MG/1
TABLET, FILM COATED ORAL
Qty: 90 TABLET | Refills: 0 | Status: SHIPPED
Start: 2020-04-13 | End: 2020-07-13

## 2020-04-28 ENCOUNTER — OFFICE VISIT (OUTPATIENT)
Dept: PRIMARY CARE CLINIC | Age: 65
End: 2020-04-28
Payer: MEDICARE

## 2020-04-28 VITALS
OXYGEN SATURATION: 98 % | HEART RATE: 80 BPM | WEIGHT: 185 LBS | BODY MASS INDEX: 32.78 KG/M2 | DIASTOLIC BLOOD PRESSURE: 64 MMHG | TEMPERATURE: 97.6 F | HEIGHT: 63 IN | SYSTOLIC BLOOD PRESSURE: 112 MMHG

## 2020-04-28 PROBLEM — K59.1 FUNCTIONAL DIARRHEA: Status: ACTIVE | Noted: 2020-04-28

## 2020-04-28 PROCEDURE — 1090F PRES/ABSN URINE INCON ASSESS: CPT | Performed by: INTERNAL MEDICINE

## 2020-04-28 PROCEDURE — 4040F PNEUMOC VAC/ADMIN/RCVD: CPT | Performed by: INTERNAL MEDICINE

## 2020-04-28 PROCEDURE — 3017F COLORECTAL CA SCREEN DOC REV: CPT | Performed by: INTERNAL MEDICINE

## 2020-04-28 PROCEDURE — 1123F ACP DISCUSS/DSCN MKR DOCD: CPT | Performed by: INTERNAL MEDICINE

## 2020-04-28 PROCEDURE — 2022F DILAT RTA XM EVC RTNOPTHY: CPT | Performed by: INTERNAL MEDICINE

## 2020-04-28 PROCEDURE — G8427 DOCREV CUR MEDS BY ELIG CLIN: HCPCS | Performed by: INTERNAL MEDICINE

## 2020-04-28 PROCEDURE — 99213 OFFICE O/P EST LOW 20 MIN: CPT | Performed by: INTERNAL MEDICINE

## 2020-04-28 PROCEDURE — G8417 CALC BMI ABV UP PARAM F/U: HCPCS | Performed by: INTERNAL MEDICINE

## 2020-04-28 PROCEDURE — G8400 PT W/DXA NO RESULTS DOC: HCPCS | Performed by: INTERNAL MEDICINE

## 2020-04-28 PROCEDURE — 1036F TOBACCO NON-USER: CPT | Performed by: INTERNAL MEDICINE

## 2020-04-28 PROCEDURE — 3046F HEMOGLOBIN A1C LEVEL >9.0%: CPT | Performed by: INTERNAL MEDICINE

## 2020-04-28 RX ORDER — ERGOCALCIFEROL 1.25 MG/1
50000 CAPSULE ORAL WEEKLY
COMMUNITY
Start: 2020-04-13

## 2020-04-28 ASSESSMENT — PATIENT HEALTH QUESTIONNAIRE - PHQ9
SUM OF ALL RESPONSES TO PHQ QUESTIONS 1-9: 0
1. LITTLE INTEREST OR PLEASURE IN DOING THINGS: 0
2. FEELING DOWN, DEPRESSED OR HOPELESS: 0
SUM OF ALL RESPONSES TO PHQ QUESTIONS 1-9: 0
SUM OF ALL RESPONSES TO PHQ9 QUESTIONS 1 & 2: 0

## 2020-04-28 NOTE — PROGRESS NOTES
tablet, Take 325 mg by mouth every other day, Disp: , Rfl:     Ascorbic Acid (VITAMIN C) 250 MG tablet, Take 250 mg by mouth every other day, Disp: , Rfl:     atorvastatin (LIPITOR) 40 MG tablet, Take 1 tablet by mouth daily, Disp: 90 tablet, Rfl: 1    esomeprazole (NEXIUM) 40 MG delayed release capsule, Take 1 capsule by mouth every morning (before breakfast), Disp: 90 capsule, Rfl: 1    glimepiride (AMARYL) 4 MG tablet, Take 4 mg by mouth 2 times daily (before meals), Disp: , Rfl:     Empagliflozin-Linagliptin (GLYXAMBI) 25-5 MG TABS, Take 1 tablet by mouth daily, Disp: , Rfl:     pioglitazone (ACTOS) 15 MG tablet, Take 15 mg by mouth every evening , Disp: , Rfl:     levothyroxine (SYNTHROID) 150 MCG tablet, Take 150 mcg by mouth Daily , Disp: , Rfl:   Allergies: Penicillin - rash  Sulfa - rash  Zyrtec - rash  z-suzanne - rash  PMH:  Health Maintenance:  Mammogram Screening - (11/24/2014)  Bone Density Test Screening - (4/27/2011)  EGD - 2002 GREEN  2005 LUIS  Colonoscopy - (2006) wnl  Mammogram - (2009) wnl  Pneumonia Vaccination - 2005  Influenza Vaccination - (2016)  Tdap - (2/16/2011)  Eye Exams - YEARLY JAYMIE  Hepatitis B Series - YEARS AGO  Pelvic/Pap Exam - (4/12/2012) pap & hpv neg  Bone Density Test - (4/27/2011) normal but low normal bone density  Stress Test - (3/2016) LEXISCAN-NORMAL  Medical Problems:  Type 2 Diabetes - DIET CONTROLLED 1995  BEGAN MEDS 2005 COROLLO  Hypothyroidism - 1987  Palpitations - FULL EVALUATION KALYAN 2000- BEGAN METOPROLOL  STRESS TEST 2005- ABNORMAL  HEART CATH 2005 NORMAL [facility]  GERD - EGD 2002 GREEN  EGD 2005 LUIS- REFERRED BACK 2/11  Hyperlipidemia - 2005 BEGAN STATINS  Seasonal Allergies - (2/16/2011) BEGAN FLONASE  Recurrent Sinusitis - NASAL SWAB TAKEN, MAY NEED IMMUNGLOBULIN STUDIES  Anxiety - (6/26/2013) BEGAN PAXIL  Depression, Reflux Disease  Osteoarthritis - (2017) KNEES- REFERRED TO BRIGIDO  Insomnia - (6/26/2013) BEGAN PAXIL-GOOD RESPONSE 13  Right Breast Swelling And Pain - NEGATIVE DIAGNOSTIC MAMMOGRAM/DR ARORA  Nausea/Vomiting/Diarrhea - PROBABLE SE FROM BYDUREON  Chest Pain - (2016) EKG/STRESS TEST/CHANGE TO METOPROLOL SUCCINATE  Kidney Disease - (2017) QUESTIONABLE CONTROL  Post-Op Anemia - (2017)  Tinnitus - REFERRED TO KATHERYN  Hearing Loss - (3/14/2018) KATHERYN  Surgical Hx:  Tubal Ligation  Endometrieosis - () laparoscopic  Cholecystectomy - (2012)  Knees-bilateral replacement  Reviewed, no changes. FH:  Father:  Liver Cancer. Mother:  Esophagus Cancer, Non Insulin Dependent Diabetes, Thyroid Disease. Sister 1:  Insulin Dependent Diabetes. Reviewed, no changes. SH:  Marital: Legal Status: . Personal Habits: Cigarette Use: Former Cigarette Smoker 2 Packs Daily, Pack Years - 27, Quit -  18. Alcohol: Never used alcohol. Daily Caffeine: Consumes on average 1 soda per day. Exercise  Type: Walks 3 times a week. Reviewed, no changes. Date: 2019  Was the patient queried about smoking behavior? Yes No  Heidy Johnson  1955 Page #3  Does the patient currently smoke? Smoking: Patient is a former smoker. Objective  BP: 122/80 Pulse: 76 Ht: 63\" 5'3\" Ht cm: 160.0 Wt: 186lb Wt Prior: 187lb as of 10/31/18 Wt Dif: -1lb Wt  k.370 Wt kg Prior: 84.823 as of 10/31/18 Wt kg Dif: -0.453 BMI: 32.9 BSA: 1.87  Exam:  Const: Appears healthy, well developed and well nourished. Appears obese. Eyes: EOMI in both eyes. PERRL. ENMT: External ears WNL. TM's intact and middle ear well aerated. External nose WNL. Moistness  and normal color of the nasal mucosae. Septum is in the midline. Multiple missing teeth. Gums  appear healthy. Posterior pharynx shows clear postnasal drip, but no exudate, irritation or redness. Neck: Supple and symmetric. Palpation reveals no adenopathy. No masses appreciated. Thyroid  exhibits no nodule or thyromegaly. No JVD. Resp: Respirations are unlabored.  Respiration rate is normal.

## 2020-05-04 RX ORDER — ESOMEPRAZOLE MAGNESIUM 40 MG/1
CAPSULE, DELAYED RELEASE ORAL
Qty: 90 CAPSULE | Refills: 1 | Status: SHIPPED
Start: 2020-05-04 | End: 2020-10-26 | Stop reason: SDUPTHER

## 2020-05-04 RX ORDER — ATORVASTATIN CALCIUM 40 MG/1
TABLET, FILM COATED ORAL
Qty: 90 TABLET | Refills: 1 | Status: SHIPPED
Start: 2020-05-04 | End: 2021-02-05 | Stop reason: SDUPTHER

## 2020-05-04 NOTE — TELEPHONE ENCOUNTER
Last Appointment:  4/28/2020  Future Appointments   Date Time Provider Zara Cordoba   10/28/2020  1:00 PM Chandni Giraldo  [address] [place] [address]

## 2020-07-13 RX ORDER — METOPROLOL TARTRATE 50 MG/1
TABLET, FILM COATED ORAL
Qty: 90 TABLET | Refills: 0 | Status: SHIPPED
Start: 2020-07-13 | End: 2020-10-26 | Stop reason: SDUPTHER

## 2020-07-13 RX ORDER — PAROXETINE HYDROCHLORIDE 40 MG/1
TABLET, FILM COATED ORAL
Qty: 90 TABLET | Refills: 0 | Status: SHIPPED
Start: 2020-07-13 | End: 2020-10-26 | Stop reason: SDUPTHER

## 2020-07-13 NOTE — TELEPHONE ENCOUNTER
Last Appointment:  4/28/2020  Future Appointments   Date Time Provider Zara Cordoba   10/28/2020  1:00 PM MD ROGE Borrero [facility]      Refill requested.

## 2020-08-20 ENCOUNTER — HOSPITAL ENCOUNTER (OUTPATIENT)
Age: 65
Discharge: HOME | End: 2020-08-22
Payer: MEDICARE

## 2020-08-20 LAB
ALBUMIN SERPL-MCNC: 4.2 G/DL (ref 3.5–5.2)
ALP BLD-CCNC: 117 U/L (ref 35–104)
ALT SERPL-CCNC: 19 U/L (ref 0–32)
ANION GAP SERPL CALCULATED.3IONS-SCNC: 18 MMOL/L (ref 7–16)
AST SERPL-CCNC: 18 U/L (ref 0–31)
BILIRUB SERPL-MCNC: 0.2 MG/DL (ref 0–1.2)
BUN BLDV-MCNC: 15 MG/DL (ref 8–23)
CALCIUM SERPL-MCNC: 9.6 MG/DL (ref 8.6–10.2)
CHLORIDE BLD-SCNC: 104 MMOL/L (ref 98–107)
CHOLESTEROL, TOTAL: 234 MG/DL (ref 0–199)
CO2: 19 MMOL/L (ref 22–29)
CREAT SERPL-MCNC: 1.2 MG/DL (ref 0.5–1)
CREATININE URINE: 119 MG/DL (ref 29–226)
GFR AFRICAN AMERICAN: 54
GFR NON-AFRICAN AMERICAN: 45 ML/MIN/1.73
GLUCOSE BLD-MCNC: 210 MG/DL (ref 74–99)
HBA1C MFR BLD: 9 % (ref 4–5.6)
HDLC SERPL-MCNC: 54 MG/DL
LDL CHOLESTEROL CALCULATED: 116 MG/DL (ref 0–99)
MICROALBUMIN UR-MCNC: <12 MG/L
MICROALBUMIN/CREAT UR-RTO: ABNORMAL (ref 0–30)
POTASSIUM SERPL-SCNC: 5 MMOL/L (ref 3.5–5)
SODIUM BLD-SCNC: 141 MMOL/L (ref 132–146)
T4 FREE: <0.1 NG/DL (ref 0.93–1.7)
TOTAL PROTEIN: 7.1 G/DL (ref 6.4–8.3)
TRIGL SERPL-MCNC: 318 MG/DL (ref 0–149)
TSH SERPL DL<=0.05 MIU/L-ACNC: 147.2 UIU/ML (ref 0.27–4.2)
VLDLC SERPL CALC-MCNC: 64 MG/DL

## 2020-08-20 PROCEDURE — 80061 LIPID PANEL: CPT

## 2020-08-20 PROCEDURE — 84443 ASSAY THYROID STIM HORMONE: CPT

## 2020-08-20 PROCEDURE — 82570 ASSAY OF URINE CREATININE: CPT

## 2020-08-20 PROCEDURE — 82044 UR ALBUMIN SEMIQUANTITATIVE: CPT

## 2020-08-20 PROCEDURE — 84439 ASSAY OF FREE THYROXINE: CPT

## 2020-08-20 PROCEDURE — 36415 COLL VENOUS BLD VENIPUNCTURE: CPT

## 2020-08-20 PROCEDURE — 80053 COMPREHEN METABOLIC PANEL: CPT

## 2020-08-20 PROCEDURE — 83036 HEMOGLOBIN GLYCOSYLATED A1C: CPT

## 2020-08-20 PROCEDURE — 83721 ASSAY OF BLOOD LIPOPROTEIN: CPT

## 2020-08-24 LAB
Lab: NORMAL
REPORT: NORMAL
THIS TEST SENT TO: NORMAL

## 2020-09-03 ENCOUNTER — APPOINTMENT (OUTPATIENT)
Dept: GENERAL RADIOLOGY | Age: 65
End: 2020-09-03
Payer: MEDICARE

## 2020-09-03 ENCOUNTER — HOSPITAL ENCOUNTER (OUTPATIENT)
Age: 65
Setting detail: OBSERVATION
Discharge: HOME | End: 2020-09-04
Attending: EMERGENCY MEDICINE | Admitting: INTERNAL MEDICINE
Payer: MEDICARE

## 2020-09-03 PROBLEM — R10.32 LLQ PAIN: Status: RESOLVED | Noted: 2019-12-11 | Resolved: 2020-09-03

## 2020-09-03 PROBLEM — R19.7 DIARRHEA OF PRESUMED INFECTIOUS ORIGIN: Status: RESOLVED | Noted: 2019-12-11 | Resolved: 2020-09-03

## 2020-09-03 PROBLEM — R07.9 CHEST PAIN: Status: ACTIVE | Noted: 2020-09-03

## 2020-09-03 LAB
ALBUMIN SERPL-MCNC: 4.5 G/DL (ref 3.5–5.2)
ALP BLD-CCNC: 133 U/L (ref 35–104)
ALT SERPL-CCNC: 19 U/L (ref 0–32)
ANION GAP SERPL CALCULATED.3IONS-SCNC: 14 MMOL/L (ref 7–16)
AST SERPL-CCNC: 21 U/L (ref 0–31)
BASOPHILS ABSOLUTE: 0.03 E9/L (ref 0–0.2)
BASOPHILS RELATIVE PERCENT: 0.5 % (ref 0–2)
BILIRUB SERPL-MCNC: 0.3 MG/DL (ref 0–1.2)
BUN BLDV-MCNC: 21 MG/DL (ref 8–23)
CALCIUM SERPL-MCNC: 10.2 MG/DL (ref 8.6–10.2)
CHLORIDE BLD-SCNC: 98 MMOL/L (ref 98–107)
CO2: 23 MMOL/L (ref 22–29)
CREAT SERPL-MCNC: 1.1 MG/DL (ref 0.5–1)
D DIMER: <200 NG/ML DDU
EKG ATRIAL RATE: 80 BPM
EKG P AXIS: 53 DEGREES
EKG P-R INTERVAL: 154 MS
EKG Q-T INTERVAL: 396 MS
EKG QRS DURATION: 72 MS
EKG QTC CALCULATION (BAZETT): 456 MS
EKG R AXIS: -20 DEGREES
EKG T AXIS: 175 DEGREES
EKG VENTRICULAR RATE: 80 BPM
EOSINOPHILS ABSOLUTE: 0.09 E9/L (ref 0.05–0.5)
EOSINOPHILS RELATIVE PERCENT: 1.4 % (ref 0–6)
GFR AFRICAN AMERICAN: >60
GFR NON-AFRICAN AMERICAN: 50 ML/MIN/1.73
GLUCOSE BLD-MCNC: 178 MG/DL (ref 74–99)
HCT VFR BLD CALC: 42.3 % (ref 34–48)
HEMOGLOBIN: 13.5 G/DL (ref 11.5–15.5)
IMMATURE GRANULOCYTES #: 0.05 E9/L
IMMATURE GRANULOCYTES %: 0.8 % (ref 0–5)
LYMPHOCYTES ABSOLUTE: 0.91 E9/L (ref 1.5–4)
LYMPHOCYTES RELATIVE PERCENT: 14.3 % (ref 20–42)
MCH RBC QN AUTO: 29.5 PG (ref 26–35)
MCHC RBC AUTO-ENTMCNC: 31.9 % (ref 32–34.5)
MCV RBC AUTO: 92.6 FL (ref 80–99.9)
METER GLUCOSE: 102 MG/DL (ref 74–99)
METER GLUCOSE: 157 MG/DL (ref 74–99)
MONOCYTES ABSOLUTE: 0.4 E9/L (ref 0.1–0.95)
MONOCYTES RELATIVE PERCENT: 6.3 % (ref 2–12)
NEUTROPHILS ABSOLUTE: 4.88 E9/L (ref 1.8–7.3)
NEUTROPHILS RELATIVE PERCENT: 76.7 % (ref 43–80)
PDW BLD-RTO: 16.4 FL (ref 11.5–15)
PLATELET # BLD: 246 E9/L (ref 130–450)
PMV BLD AUTO: 11.2 FL (ref 7–12)
POTASSIUM REFLEX MAGNESIUM: 4.3 MMOL/L (ref 3.5–5)
RBC # BLD: 4.57 E12/L (ref 3.5–5.5)
REASON FOR REJECTION: NORMAL
REJECTED TEST: NORMAL
SODIUM BLD-SCNC: 135 MMOL/L (ref 132–146)
TOTAL PROTEIN: 7.5 G/DL (ref 6.4–8.3)
TROPONIN: <0.01 NG/ML (ref 0–0.03)
WBC # BLD: 6.4 E9/L (ref 4.5–11.5)

## 2020-09-03 PROCEDURE — 6370000000 HC RX 637 (ALT 250 FOR IP): Performed by: INTERNAL MEDICINE

## 2020-09-03 PROCEDURE — 80053 COMPREHEN METABOLIC PANEL: CPT

## 2020-09-03 PROCEDURE — G0378 HOSPITAL OBSERVATION PER HR: HCPCS

## 2020-09-03 PROCEDURE — 96372 THER/PROPH/DIAG INJ SC/IM: CPT

## 2020-09-03 PROCEDURE — 6370000000 HC RX 637 (ALT 250 FOR IP): Performed by: EMERGENCY MEDICINE

## 2020-09-03 PROCEDURE — 82962 GLUCOSE BLOOD TEST: CPT

## 2020-09-03 PROCEDURE — 2580000003 HC RX 258: Performed by: EMERGENCY MEDICINE

## 2020-09-03 PROCEDURE — 71045 X-RAY EXAM CHEST 1 VIEW: CPT

## 2020-09-03 PROCEDURE — 85025 COMPLETE CBC W/AUTO DIFF WBC: CPT

## 2020-09-03 PROCEDURE — 99282 EMERGENCY DEPT VISIT SF MDM: CPT

## 2020-09-03 PROCEDURE — 36415 COLL VENOUS BLD VENIPUNCTURE: CPT

## 2020-09-03 PROCEDURE — 84484 ASSAY OF TROPONIN QUANT: CPT

## 2020-09-03 PROCEDURE — 93005 ELECTROCARDIOGRAM TRACING: CPT | Performed by: EMERGENCY MEDICINE

## 2020-09-03 PROCEDURE — 6360000002 HC RX W HCPCS: Performed by: INTERNAL MEDICINE

## 2020-09-03 PROCEDURE — 85378 FIBRIN DEGRADE SEMIQUANT: CPT

## 2020-09-03 PROCEDURE — 93010 ELECTROCARDIOGRAM REPORT: CPT | Performed by: INTERNAL MEDICINE

## 2020-09-03 PROCEDURE — 99283 EMERGENCY DEPT VISIT LOW MDM: CPT

## 2020-09-03 RX ORDER — ACETAMINOPHEN 325 MG/1
650 TABLET ORAL EVERY 6 HOURS PRN
Status: DISCONTINUED | OUTPATIENT
Start: 2020-09-03 | End: 2020-09-04 | Stop reason: HOSPADM

## 2020-09-03 RX ORDER — PROMETHAZINE HYDROCHLORIDE 25 MG/1
12.5 TABLET ORAL EVERY 6 HOURS PRN
Status: DISCONTINUED | OUTPATIENT
Start: 2020-09-03 | End: 2020-09-04 | Stop reason: HOSPADM

## 2020-09-03 RX ORDER — SODIUM CHLORIDE 0.9 % (FLUSH) 0.9 %
10 SYRINGE (ML) INJECTION PRN
Status: DISCONTINUED | OUTPATIENT
Start: 2020-09-03 | End: 2020-09-04 | Stop reason: HOSPADM

## 2020-09-03 RX ORDER — SODIUM CHLORIDE 0.9 % (FLUSH) 0.9 %
10 SYRINGE (ML) INJECTION EVERY 12 HOURS SCHEDULED
Status: DISCONTINUED | OUTPATIENT
Start: 2020-09-03 | End: 2020-09-04 | Stop reason: HOSPADM

## 2020-09-03 RX ORDER — ONDANSETRON 2 MG/ML
4 INJECTION INTRAMUSCULAR; INTRAVENOUS EVERY 6 HOURS PRN
Status: DISCONTINUED | OUTPATIENT
Start: 2020-09-03 | End: 2020-09-04 | Stop reason: HOSPADM

## 2020-09-03 RX ORDER — ESOMEPRAZOLE MAGNESIUM 40 MG/1
40 CAPSULE, DELAYED RELEASE ORAL NIGHTLY
Status: DISCONTINUED | OUTPATIENT
Start: 2020-09-03 | End: 2020-09-03 | Stop reason: ALTCHOICE

## 2020-09-03 RX ORDER — METFORMIN HYDROCHLORIDE 500 MG/1
500 TABLET, EXTENDED RELEASE ORAL
Status: DISCONTINUED | OUTPATIENT
Start: 2020-09-04 | End: 2020-09-04 | Stop reason: HOSPADM

## 2020-09-03 RX ORDER — PANTOPRAZOLE SODIUM 40 MG/1
40 TABLET, DELAYED RELEASE ORAL NIGHTLY
Status: DISCONTINUED | OUTPATIENT
Start: 2020-09-03 | End: 2020-09-04 | Stop reason: HOSPADM

## 2020-09-03 RX ORDER — LEVOTHYROXINE SODIUM 0.07 MG/1
150 TABLET ORAL DAILY
Status: DISCONTINUED | OUTPATIENT
Start: 2020-09-03 | End: 2020-09-04

## 2020-09-03 RX ORDER — DEXTROSE MONOHYDRATE 50 MG/ML
100 INJECTION, SOLUTION INTRAVENOUS PRN
Status: DISCONTINUED | OUTPATIENT
Start: 2020-09-03 | End: 2020-09-04 | Stop reason: HOSPADM

## 2020-09-03 RX ORDER — PAROXETINE HYDROCHLORIDE 20 MG/1
40 TABLET, FILM COATED ORAL NIGHTLY
Status: DISCONTINUED | OUTPATIENT
Start: 2020-09-03 | End: 2020-09-04 | Stop reason: HOSPADM

## 2020-09-03 RX ORDER — ATORVASTATIN CALCIUM 40 MG/1
40 TABLET, FILM COATED ORAL NIGHTLY
Status: DISCONTINUED | OUTPATIENT
Start: 2020-09-03 | End: 2020-09-04 | Stop reason: HOSPADM

## 2020-09-03 RX ORDER — METOPROLOL TARTRATE 50 MG/1
50 TABLET, FILM COATED ORAL DAILY
Status: DISCONTINUED | OUTPATIENT
Start: 2020-09-03 | End: 2020-09-04 | Stop reason: HOSPADM

## 2020-09-03 RX ORDER — ALOGLIPTIN 25 MG/1
25 TABLET, FILM COATED ORAL DAILY
Status: DISCONTINUED | OUTPATIENT
Start: 2020-09-03 | End: 2020-09-04 | Stop reason: HOSPADM

## 2020-09-03 RX ORDER — DEXTROSE MONOHYDRATE 25 G/50ML
12.5 INJECTION, SOLUTION INTRAVENOUS PRN
Status: DISCONTINUED | OUTPATIENT
Start: 2020-09-03 | End: 2020-09-04 | Stop reason: HOSPADM

## 2020-09-03 RX ORDER — ASPIRIN 81 MG/1
81 TABLET, CHEWABLE ORAL DAILY
Status: DISCONTINUED | OUTPATIENT
Start: 2020-09-04 | End: 2020-09-04 | Stop reason: HOSPADM

## 2020-09-03 RX ORDER — ASPIRIN 325 MG
325 TABLET ORAL ONCE
Status: COMPLETED | OUTPATIENT
Start: 2020-09-03 | End: 2020-09-03

## 2020-09-03 RX ORDER — PIOGLITAZONEHYDROCHLORIDE 15 MG/1
15 TABLET ORAL NIGHTLY
Status: DISCONTINUED | OUTPATIENT
Start: 2020-09-03 | End: 2020-09-04 | Stop reason: HOSPADM

## 2020-09-03 RX ORDER — NICOTINE POLACRILEX 4 MG
15 LOZENGE BUCCAL PRN
Status: DISCONTINUED | OUTPATIENT
Start: 2020-09-03 | End: 2020-09-04 | Stop reason: HOSPADM

## 2020-09-03 RX ORDER — ACETAMINOPHEN 650 MG/1
650 SUPPOSITORY RECTAL EVERY 6 HOURS PRN
Status: DISCONTINUED | OUTPATIENT
Start: 2020-09-03 | End: 2020-09-04 | Stop reason: HOSPADM

## 2020-09-03 RX ORDER — GLIMEPIRIDE 4 MG/1
4 TABLET ORAL
Status: DISCONTINUED | OUTPATIENT
Start: 2020-09-03 | End: 2020-09-04 | Stop reason: HOSPADM

## 2020-09-03 RX ADMIN — GLIMEPIRIDE 4 MG: 4 TABLET ORAL at 17:37

## 2020-09-03 RX ADMIN — PAROXETINE HYDROCHLORIDE 40 MG: 20 TABLET, FILM COATED ORAL at 22:09

## 2020-09-03 RX ADMIN — ASPIRIN 325 MG: 325 TABLET, FILM COATED ORAL at 13:37

## 2020-09-03 RX ADMIN — ACETAMINOPHEN 650 MG: 325 TABLET ORAL at 17:37

## 2020-09-03 RX ADMIN — PANTOPRAZOLE SODIUM 40 MG: 40 TABLET, DELAYED RELEASE ORAL at 22:08

## 2020-09-03 RX ADMIN — ENOXAPARIN SODIUM 40 MG: 40 INJECTION SUBCUTANEOUS at 17:37

## 2020-09-03 RX ADMIN — SODIUM CHLORIDE, PRESERVATIVE FREE 10 ML: 5 INJECTION INTRAVENOUS at 22:09

## 2020-09-03 RX ADMIN — INSULIN LISPRO 1 UNITS: 100 INJECTION, SOLUTION INTRAVENOUS; SUBCUTANEOUS at 22:38

## 2020-09-03 RX ADMIN — PIOGLITAZONE 15 MG: 15 TABLET ORAL at 22:08

## 2020-09-03 RX ADMIN — ATORVASTATIN CALCIUM 40 MG: 40 TABLET, FILM COATED ORAL at 22:08

## 2020-09-03 ASSESSMENT — PAIN SCALES - GENERAL
PAINLEVEL_OUTOF10: 0
PAINLEVEL_OUTOF10: 0

## 2020-09-03 NOTE — ED PROVIDER NOTES
HPI:  9/3/20,   Time: 11:34 AM EDT       Cassie Delgado is a 71 y.o. female presenting to the ED for chest pressure, beginning 2 days ago. The complaint has been intermittent, moderate in severity, and worsened by walking. Patient is a pleasant 69-year-old female with history of diabetes hyperlipidemia and obesity. Denies any family history of heart disease. She herself has never had any cardiac stents or true coronary artery disease. She states her last chest test was greater than 5 years ago. She states is under a lot of emotional stress as her 's occlusion clinic. She states that she has been having chest pain waxing and waning last as long as 30 minutes to an hour at a time over the course the past 2 days pressure nature rating to the left side of the chest no back pain no sweating. She states when she has the pain she does feel somewhat short of breath with exertion. No fevers or chills no cough. No pleuritic pain. She is never had an MI. She is no pain at this time. Last pain she had was just prior to arrival here this morning. The pain lasted a full hour. Review of Systems:   Pertinent positives and negatives are stated within HPI, all other systems reviewed and are negative.          --------------------------------------------- PAST HISTORY ---------------------------------------------  Past Medical History:  has a past medical history of Acid reflux disease, Anxiety, Breast swelling, Chest pain, Depression, Endometriosis, GERD (gastroesophageal reflux disease), Hearing loss, Hyperlipidemia, Hypothyroidism, Insomnia, Kidney disease, Nausea vomiting and diarrhea, Osteoarthritis, Palpitations, Postoperative anemia, Recurrent sinusitis, Seasonal allergies, Tinnitus, Type 2 diabetes mellitus without complication ([facility] [address] and Type II or unspecified type diabetes mellitus without mention of complication, not stated as uncontrolled.     Past Surgical History:  has a past surgical history that includes Colonoscopy (2006); Tubal ligation; Cholecystectomy (1/2012); knee surgery; Cardiac catheterization; and Upper gastrointestinal endoscopy. Social History:  reports that she quit smoking about 24 years ago. She started smoking about 55 years ago. She has a 60.00 pack-year smoking history. She has never used smokeless tobacco. She reports previous alcohol use. She reports that she does not use drugs. Family History: family history includes Cancer in her father and mother; Diabetes in her mother and sister; Thyroid Disease in her mother. The patients home medications have been reviewed. Allergies: Azithromycin; Pcn [penicillins]; Sulfa antibiotics; and Zyrtec [cetirizine]        ---------------------------------------------------PHYSICAL EXAM--------------------------------------    Constitutional/General: Alert and oriented x3, well appearing, non toxic in NAD; obese  Head: Normocephalic and atraumatic  Eyes: PERRL, EOMI, conjunctive normal, sclera non icteric  Mouth: Oropharynx clear, handling secretions, no trismus, no asymmetry of the posterior oropharynx or uvular edema  Neck: Supple, full ROM, non tender to palpation in the midline, no stridor, no crepitus, no meningeal signs  Respiratory: Lungs clear to auscultation bilaterally, no wheezes, rales, or rhonchi. Not in respiratory distress  Cardiovascular:  Regular rate. Regular rhythm. No murmurs, gallops, or rubs. 2+ distal pulses  Chest: No chest wall tenderness  GI:  Abdomen Soft, Non tender, Non distended. +BS. No organomegaly, no palpable masses,  No rebound, guarding, or rigidity. Musculoskeletal: Moves all extremities x 4. Warm and well perfused, no clubbing, cyanosis, or edema. Capillary refill <3 seconds  Integument: skin warm and dry. No rashes.    Lymphatic: no lymphadenopathy noted  Neurologic: GCS 15, no focal deficits, symmetric strength 5/5 in the upper and lower extremities bilaterally  Psychiatric: Normal Affect        HEART Score For Major Cardiac Events  (Max Score 10 Points)  HISTORY       []   Slightly Suspicious  0       [x]   Moderately Suspicious  +1       []   Highly Suspicious  +2    EK point: No ST deviation but LBBB, LVH repolarization changes (ex:digoxin);               2 points: ST deviation not due to LBBB, LVH or digoxin         [x]   Normal  0       []   Nonspecific Repolarization Disturbance  +1       []   Significant ST Depression  +2    AGE       []   <45  0       []   45-64  +1       [x]    >65  +2    RISK FACTORS:  1. HTN    2. Hypercholesterolemia    3. DM     4. Cigarette smoking (current or cessation < 3 mos)    5. Positive family history  (parent or sibling with CVD before age 72). 6. Obesity (BMI >30kg/m2)         []   No Risk factors Known  0       []   1-2 Risk Factors  +1       [x]   >3 Risk Factors or History of Atherosclerotic Disease  +2      INITIAL TROPONIN       [x]   < Normal Limit   0       []   1-3 x Normal Limit   +1       []   >3 x Normal Limit   +2     -----------------------------------------------------------------------------------------------------------------  SCORE TOTAL:  5 POINTS     Low Score          (0-3 Points), risk of MACE of 0.9-1.7% (discuss d/c home with f/u)  Mod Score          (4-6 Points), risk of MACE of 12-16.6% (discuss admission for further testing)  High Score         (7-10 Points), risk of MACE of 50-65% (Admit ALL as they are candidates for early invasive measures)    -------------------------------------------------- RESULTS -------------------------------------------------  I have personally reviewed all laboratory and imaging results for this patient. Results are listed below.      LABS:  Results for orders placed or performed during the hospital encounter of 20   CBC auto differential   Result Value Ref Range    WBC 6.4 4.5 - 11.5 E9/L    RBC 4.57 3.50 - 5.50 E12/L    Hemoglobin 13.5 11.5 - 15.5 g/dL    Hematocrit 42.3 34.0 - 48.0 % MCV 92.6 80.0 - 99.9 fL    MCH 29.5 26.0 - 35.0 pg    MCHC 31.9 (L) 32.0 - 34.5 %    RDW 16.4 (H) 11.5 - 15.0 fL    Platelets 264 941 - 356 E9/L    MPV 11.2 7.0 - 12.0 fL    Neutrophils % 76.7 43.0 - 80.0 %    Immature Granulocytes % 0.8 0.0 - 5.0 %    Lymphocytes % 14.3 (L) 20.0 - 42.0 %    Monocytes % 6.3 2.0 - 12.0 %    Eosinophils % 1.4 0.0 - 6.0 %    Basophils % 0.5 0.0 - 2.0 %    Neutrophils Absolute 4.88 1.80 - 7.30 E9/L    Immature Granulocytes # 0.05 E9/L    Lymphocytes Absolute 0.91 (L) 1.50 - 4.00 E9/L    Monocytes Absolute 0.40 0.10 - 0.95 E9/L    Eosinophils Absolute 0.09 0.05 - 0.50 E9/L    Basophils Absolute 0.03 0.00 - 0.20 E9/L   Comprehensive Metabolic Panel w/ Reflex to MG   Result Value Ref Range    Sodium 135 132 - 146 mmol/L    Potassium reflex Magnesium 4.3 3.5 - 5.0 mmol/L    Chloride 98 98 - 107 mmol/L    CO2 23 22 - 29 mmol/L    Anion Gap 14 7 - 16 mmol/L    Glucose 178 (H) 74 - 99 mg/dL    BUN 21 8 - 23 mg/dL    CREATININE 1.1 (H) 0.5 - 1.0 mg/dL    GFR Non-African American 50 >=60 mL/min/1.73    GFR African American >60     Calcium 10.2 8.6 - 10.2 mg/dL    Total Protein 7.5 6.4 - 8.3 g/dL    Alb 4.5 3.5 - 5.2 g/dL    Total Bilirubin 0.3 0.0 - 1.2 mg/dL    Alkaline Phosphatase 133 (H) 35 - 104 U/L    ALT 19 0 - 32 U/L    AST 21 0 - 31 U/L   Troponin   Result Value Ref Range    Troponin <0.01 0.00 - 0.03 ng/mL   D-Dimer, Quantitative   Result Value Ref Range    D-Dimer, Quant <200 ng/mL DDU   SPECIMEN REJECTION   Result Value Ref Range    Rejected Test TROP     Reason for Rejection see below    POCT Glucose   Result Value Ref Range    Meter Glucose 102 (H) 74 - 99 mg/dL   EKG 12 Lead   Result Value Ref Range    Ventricular Rate 80 BPM    Atrial Rate 80 BPM    P-R Interval 154 ms    QRS Duration 72 ms    Q-T Interval 396 ms    QTc Calculation (Bazett) 456 ms    P Axis 53 degrees    R Axis -20 degrees    T Axis 175 degrees       RADIOLOGY:  Interpreted by Radiologist.  XR CHEST PORTABLE Final Result   Minimal atelectasis and pleural effusions in the left lung base. EKG:  This EKG is signed and interpreted by the EP. Time: 10:28  Rate: 80  Rhythm: Sinus  Interpretation: non-specific EKG; no st changes; new t wave inversions in lateral leads  Comparison: changes compared to previous EKG; new nonspecific T wave inversions in the lateral leads. No ST changes. ------------------------- NURSING NOTES AND VITALS REVIEWED ---------------------------   The nursing notes within the ED encounter and vital signs as below have been reviewed by myself. /64   Pulse 74   Temp 98.8 °F (37.1 °C) (Oral)   Resp 14   Ht 5' 3\" (1.6 m)   Wt 202 lb (91.6 kg)   SpO2 97%   BMI 35.78 kg/m²   Oxygen Saturation Interpretation: Normal    The patients available past medical records and past encounters were reviewed.         ------------------------------ ED COURSE/MEDICAL DECISION MAKING----------------------  Medications   sodium chloride flush 0.9 % injection 10 mL (has no administration in time range)   sodium chloride flush 0.9 % injection 10 mL (has no administration in time range)   glimepiride (AMARYL) tablet 4 mg (4 mg Oral Given 9/3/20 1737)   levothyroxine (SYNTHROID) tablet 150 mcg (150 mcg Oral Not Given 9/3/20 1710)   metFORMIN (GLUCOPHAGE-XR) extended release tablet 500 mg (has no administration in time range)   metoprolol tartrate (LOPRESSOR) tablet 50 mg (50 mg Oral Not Given 9/3/20 1711)   PARoxetine (PAXIL) tablet 40 mg (has no administration in time range)   pioglitazone (ACTOS) tablet 15 mg (has no administration in time range)   acetaminophen (TYLENOL) tablet 650 mg (650 mg Oral Given 9/3/20 1737)     Or   acetaminophen (TYLENOL) suppository 650 mg ( Rectal See Alternative 9/3/20 1737)   magnesium hydroxide (MILK OF MAGNESIA) 400 MG/5ML suspension 30 mL (has no administration in time range)   promethazine (PHENERGAN) tablet 12.5 mg (has no administration in time range) Or   ondansetron (ZOFRAN) injection 4 mg (has no administration in time range)   atorvastatin (LIPITOR) tablet 40 mg (has no administration in time range)   aspirin chewable tablet 81 mg (has no administration in time range)   enoxaparin (LOVENOX) injection 40 mg (40 mg Subcutaneous Given 9/3/20 1737)   insulin lispro (HUMALOG) injection vial 0-12 Units (0 Units Subcutaneous Not Given 9/3/20 1737)   insulin lispro (HUMALOG) injection vial 0-6 Units (has no administration in time range)   glucose (GLUTOSE) 40 % oral gel 15 g (has no administration in time range)   dextrose 50 % IV solution (has no administration in time range)   glucagon (rDNA) injection 1 mg (has no administration in time range)   dextrose 5 % solution (has no administration in time range)   pantoprazole (PROTONIX) tablet 40 mg (has no administration in time range)   alogliptin (NESINA) tablet 25 mg (25 mg Oral Not Given 9/3/20 1709)   aspirin tablet 325 mg (325 mg Oral Given 9/3/20 1337)         ED COURSE:       Medical Decision Making:     Patient is a pleasant 25-year-old female with history of a history of obesity, diabetes and hyperlipidemia. She states she is been having waxing and waning intermittent chest pain for the past 2 days. She states that pressure and tightness in her mid chest area rating to the left side. She states her last breast was test was greater than 5 years ago. She has no chest pain at this time. She will need daily chest chest pain lab work d-dimer, she does have some mild shortness of breath when she has the pain. Differential diagnosis ACS, MI, dysrhythmia, angina, PE, musculoskeletal pain, stress and anxiety. This patient has remained hemodynamically stable during their ED course. Yfn Armas An EKG has showed normal sinus rhythm 80 beats a minute no signs of any ST changes. However when compared to previous EKG, she does have new T wave inversions in the lateral leads V3 through V6.   She had a d-dimer that was negative CBC that was normal chemistry was normal.  And a troponin that was negative. her chest x-ray showed no acute process. Given her moderate risk heart score, I do feel she should be admitted for further cardiac evaluation and ACS rule out. Spoke to Dr. Marianna Castillo who will admit the patient to his service monitored bed. She was given aspirin here in the department is chest pain-free upon admission. Stable for telemetry admission. Re-Evaluations:             Re-evaluation. Patients symptoms are improving    Re-examination  9/3/20   11:34 AM EDT          Vital Signs:   Vitals:    09/03/20 1139 09/03/20 1253 09/03/20 1331 09/03/20 1630   BP: 116/62 103/67 (!) 115/59 119/64   Pulse: 75 72 71 74   Resp: 16  16 14   Temp: 98 °F (36.7 °C)  98 °F (36.7 °C) 98.8 °F (37.1 °C)   TempSrc:   Oral Oral   SpO2: 94%  93% 97%   Weight:    202 lb (91.6 kg)   Height:    5' 3\" (1.6 m)             Consultations: To Dr. Marianna Castillo who admit the patient to his service observation bed for cardiac rule out. Counseling: The emergency provider has spoken with the patient and discussed todays results, in addition to providing specific details for the plan of care and counseling regarding the diagnosis and prognosis. Questions are answered at this time and they are agreeable with the plan.       --------------------------------- IMPRESSION AND DISPOSITION ---------------------------------    IMPRESSION  1. Chest pain, unspecified type        DISPOSITION  Disposition: Admit to telemetry  Patient condition is stable    NOTE: This report was transcribed using voice recognition software.  Every effort was made to ensure accuracy; however, inadvertent computerized transcription errors may be present        Ramana Elias MD  09/03/20 1939

## 2020-09-03 NOTE — H&P
Internal Medicine History & Physical     Name: Sommer Valdivia  : 1955  Chief Complaint: Chest Pain (tightness, onset yesterday) and Shortness of Breath (onset yesterday )  Primary Care Physician: Katia Bennett MD  Admission date: 9/3/2020  Date of service: 9/3/2020     History of Present Illness  Tammy Mayo is a 71y.o. year old female. The patient presents with chest tightness and SOB more then normal for the last few days. Prior to this, she was having nausea, vomiting, and diarrhea. These symptoms are moderate in severity. Symptoms are made better by nothing. Symptoms are made worse by nothing. Associated symptoms include a headache, LH, dizziness. No fever. She has had a heart cath, but she does not have any stents. This was >? 30 years ago. She has seen EP in the past for palpitations. Of note, she had to have her Metformin switched from regular to ER and she developed the nausea and vomiting. She had similar sx in December and a CT abd/pel was negative at that time. There are no family or friends at bedside. The history is provided by the patient. She is felt to be a good historian. ED course:   Initial blood work and imaging studies performed. Admission recommended by ED physician. Case discussed with ED provider.  Meds in ED consisted of the following:  Medications   sodium chloride flush 0.9 % injection 10 mL (has no administration in time range)   sodium chloride flush 0.9 % injection 10 mL (has no administration in time range)   aspirin tablet 325 mg (325 mg Oral Given 9/3/20 1337)       Past Medical History:   Diagnosis Date    Acid reflux disease     Anxiety     began paxil     Breast swelling     right - neg diagnostic mammo    Chest pain     Depression     Endometriosis     laparoscopic    GERD (gastroesophageal reflux disease)     Hearing loss     Hyperlipidemia     Hypothyroidism     Insomnia     Kidney disease     Nausea vomiting and diarrhea     Osteoarthritis 6/8/2011     ref to Jaime    Palpitations     Postoperative anemia     Recurrent sinusitis     Seasonal allergies     Tinnitus     Type 2 diabetes mellitus without complication (HCC)     Type II or unspecified type diabetes mellitus without mention of complication, not stated as uncontrolled        Past Surgical History:   Procedure Laterality Date    CARDIAC CATHETERIZATION      CHOLECYSTECTOMY  1/2012    COLONOSCOPY  2006    wnl    KNEE SURGERY      TUBAL LIGATION      UPPER GASTROINTESTINAL ENDOSCOPY         Family History   Problem Relation Age of Onset    Cancer Mother         esophageal    Diabetes Mother     Thyroid Disease Mother     Cancer Father         liver    Diabetes Sister        Social History  Patient lives at home with her . Employment: medical billing  Illicit drug use- denies  TOBACCO:   reports that she quit smoking about 24 years ago. She started smoking about 55 years ago. She has a 60.00 pack-year smoking history. She has never used smokeless tobacco.  ETOH:   reports previous alcohol use. Home Medications  Prior to Admission medications    Medication Sig Start Date End Date Taking?  Authorizing Provider   PARoxetine (PAXIL) 40 MG tablet TAKE ONE TABLET BY MOUTH EVERY MORNING  Patient taking differently: Take 40 mg by mouth nightly  7/13/20  Yes Zulema Zamudio MD   metoprolol tartrate (LOPRESSOR) 50 MG tablet TAKE ONE TABLET BY MOUTH EVERY DAY 7/13/20  Yes Zulema Zamudio MD   atorvastatin (LIPITOR) 40 MG tablet TAKE ONE TABLET BY MOUTH EVERY DAY  Patient taking differently: Take 40 mg by mouth nightly  5/4/20  Yes Zulema Zamudio MD   esomeprazole ([address]) 40 MG delayed release capsule TAKE ONE CAPSULE BY MOUTH EVERY MORNING BEFORE BREAKFAST  Patient taking differently: Take 40 mg by mouth nightly  5/4/20  Yes Zulema Zamudio MD   vitamin D (ERGOCALCIFEROL) 1.25 MG (08318 UT) CAPS capsule Take 50,000 Units by mouth once a week wednesdays 4/13/20 Yes Historical Provider, MD   metFORMIN (GLUCOPHAGE-XR) 500 MG extended release tablet TAKE TWO TABLETS BY MOUTH TWO TIMES A DAY 10/29/19  Yes Historical Provider, MD   ferrous sulfate 325 (65 Fe) MG EC tablet Take 325 mg by mouth three times a week M-W-F   Yes Historical Provider, MD   Ascorbic Acid (VITAMIN C) 250 MG tablet Take 250 mg by mouth three times a week M-W-F   Yes Historical Provider, MD   glimepiride (AMARYL) 4 MG tablet Take 4 mg by mouth 2 times daily (before meals)   Yes Historical Provider, MD   Empagliflozin-Linagliptin (GLYXAMBI) 25-5 MG TABS Take 1 tablet by mouth daily   Yes Historical Provider, MD   pioglitazone (ACTOS) 15 MG tablet Take 15 mg by mouth nightly    Yes Historical Provider, MD   levothyroxine (SYNTHROID) 150 MCG tablet Take 150 mcg by mouth Daily    Yes Historical Provider, MD       Allergies  Allergies   Allergen Reactions    Azithromycin     Pcn [Penicillins] Rash    Sulfa Antibiotics Rash    Zyrtec [Cetirizine] Rash       Review of Systems  Please see HPI above. All bolded are positive. All un-bolded are negative.   Constitutional Symptoms: fever, chills, fatigue, generalized weakness, diaphoresis, increase in thirst, loss of appetite  Eyes: vision change   Ears, Nose, Mouth, Throat: hearing loss, nasal congestion, sores in the mouth  Cardiovascular: chest pain, chest heaviness, palpitations  Respiratory: shortness of breath, wheezing, coughing  Gastrointestinal: abdominal pain, nausea, vomiting, diarrhea, constipation, melena, hematochezia, hematemesis  Genitourinary: dysuria, hematuria, increased frequency  Musculoskeletal: lower extremity edema, myalgias, arthralgias, back pain  Integumentary: rashes, itching   Neurological: headache, lightheadedness, dizziness, confusion, syncope, numbness, tingling, focal weakness  Psychiatric: depression, suicidal ideation, anxiety  Endocrine: unintentional weight change  Hematologic/Lymphatic: lymphadenopathy, easy bruising, easy bleeding   Allergic/Immunologic: recurrent infections      Objective  VITALS:  BP (!) 115/59   Pulse 71   Temp 98 °F (36.7 °C) (Oral)   Resp 16   Ht 5' 3\" (1.6 m)   Wt 198 lb (89.8 kg)   SpO2 93%   BMI 35.07 kg/m²     Physical Exam:  General: awake, alert, oriented to person, place, time, and purpose, appears stated age, cooperative, no acute distress, pleasant, appropriate mood  Eyes: conjunctivae/corneas clear, sclera non icteric, EOMI  Ears: no obvious scars, no lesions, no masses, hearing intact  Mouth: mucous membranes moist, no obvious oral sores  Head: normocephalic, atraumatic  Neck: no JVD, no adenopathy, no thyromegaly, neck is supple, trachea is midline  Back: ROM normal, no CVA tenderness.   Chest: no pain on palpation  Lungs: clear to auscultation bilaterally, without rhonchi, crackle, wheezing, or rale, no retractions or use of accessory muscles  Heart: regular rate and regular rhythm, no murmur, normal S1, S2  Abdomen: soft, non-tender; bowel sounds normal; no masses, no organomegaly  : Deferred   Extremities: no lower extremity edema, extremities atraumatic, no cyanosis, no clubbing, 2+ pedal pulses palpated  Skin: normal color, normal texture, normal turgor, no rashes, no lesions  Neurologic:5/5 muscle strength throughout, normal muscle tone throughout, face symmetric, hearing intact, tongue midline, speech appropriate without slurring, sensation to fine touch intact in upper and lower extremities    Labs-   Lab Results   Component Value Date    WBC 6.4 09/03/2020    HGB 13.5 09/03/2020    HCT 42.3 09/03/2020     09/03/2020     09/03/2020    K 4.3 09/03/2020    CL 98 09/03/2020    CREATININE 1.1 (H) 09/03/2020    BUN 21 09/03/2020    CO2 23 09/03/2020    GLUCOSE 178 (H) 09/03/2020    ALT 19 09/03/2020    AST 21 09/03/2020     Lab Results   Component Value Date    TROPONINI <0.01 09/03/2020       Recent Radiological Studies:  XR CHEST PORTABLE   Final Result   Minimal atelectasis and pleural effusions in the left lung base. Assessment  Active Hospital Problems    Diagnosis    Chest pain [R07.9]     Priority: High    Seasonal allergies [J30.2]    Insomnia [G47.00]    Depression [F32.9]    Anxiety [F41.9]    Functional diarrhea [K59.1]    Hypothyroidism [E03.9]    GERD (gastroesophageal reflux disease) [K21.9]    Type 2 diabetes mellitus without complication ([facility] [address] [Q26.1]    Hyperlipidemia [E78.5]    Osteoarthritis [M19.90]       Patient Active Problem List    Diagnosis Date Noted    Chest pain 09/03/2020     Priority: High    Seasonal allergies     Insomnia     Depression     Anxiety      began paxil 2013      Functional diarrhea 04/28/2020    Hypothyroidism 08/21/2019    GERD (gastroesophageal reflux disease) 06/22/2019    Type [facility] 80/88/9698    Hyperlipidemia 06/22/2019    Osteoarthritis 06/08/2011     ref to [address]  Chest pain: Observation. Telemetry. Cardiology consultation--defer need for further cardiac testing to cardiology. CE's pending. ASA. Lipid panel. DM, uncontrolled: Continue home DM meds-- adjust as needed. Add SSI. HbA1c. Consider Insulin as an OP and stopping PO  meds to control GI sx. Chronic N/V/D: CT abd/pel noted in Dec 2019. Hold PO Fe. · Continue home medications other then PO Fe. · Follow labs  · DVT prophylaxis. · Please see orders for further management and care. ·  for discharge planning  · Discharge plan: TBD pending clinical improvement     Toney Freeman DO  9/3/2020  2:40 PM    I can be reached through [email or web address]. NOTE:  This report was transcribed using voice recognition software. Every effort was made to ensure accuracy; however, inadvertent computerized transcription errors may be present.

## 2020-09-03 NOTE — PROGRESS NOTES
Database complete. Medications reconciled. Care plans and education initiated. Endocrinologist is Dr. Carson Boards.

## 2020-09-03 NOTE — PROGRESS NOTES
patient is on Empagliflozin-Linagliptin [facility]) there is no interchange for the Empagliflozin - (SGLT2) inhibitors, however  interchange  of the DPP-4)  inhibitor IinaGliptin was interchanged to nesina.  Dr. Laura aBiley is aware and ok with not continuing Empagliflozin while in house

## 2020-09-04 ENCOUNTER — APPOINTMENT (OUTPATIENT)
Dept: NUCLEAR MEDICINE | Age: 65
End: 2020-09-04
Payer: MEDICARE

## 2020-09-04 ENCOUNTER — APPOINTMENT (OUTPATIENT)
Dept: NON INVASIVE DIAGNOSTICS | Age: 65
End: 2020-09-04
Payer: MEDICARE

## 2020-09-04 VITALS
SYSTOLIC BLOOD PRESSURE: 128 MMHG | HEIGHT: 63 IN | RESPIRATION RATE: 16 BRPM | WEIGHT: 202 LBS | BODY MASS INDEX: 35.79 KG/M2 | TEMPERATURE: 99 F | OXYGEN SATURATION: 98 % | DIASTOLIC BLOOD PRESSURE: 67 MMHG | HEART RATE: 93 BPM

## 2020-09-04 LAB
ALBUMIN SERPL-MCNC: 4.2 G/DL (ref 3.5–5.2)
ALP BLD-CCNC: 122 U/L (ref 35–104)
ALT SERPL-CCNC: 18 U/L (ref 0–32)
ANION GAP SERPL CALCULATED.3IONS-SCNC: 12 MMOL/L (ref 7–16)
AST SERPL-CCNC: 21 U/L (ref 0–31)
BILIRUB SERPL-MCNC: 0.4 MG/DL (ref 0–1.2)
BUN BLDV-MCNC: 20 MG/DL (ref 8–23)
CALCIUM SERPL-MCNC: 10.4 MG/DL (ref 8.6–10.2)
CHLORIDE BLD-SCNC: 104 MMOL/L (ref 98–107)
CHOLESTEROL, TOTAL: 246 MG/DL (ref 0–199)
CO2: 25 MMOL/L (ref 22–29)
CREAT SERPL-MCNC: 1.1 MG/DL (ref 0.5–1)
GFR AFRICAN AMERICAN: >60
GFR NON-AFRICAN AMERICAN: 50 ML/MIN/1.73
GLUCOSE BLD-MCNC: 83 MG/DL (ref 74–99)
HBA1C MFR BLD: 8.6 % (ref 4–5.6)
HCT VFR BLD CALC: 42.6 % (ref 34–48)
HDLC SERPL-MCNC: 56 MG/DL
HEMOGLOBIN: 13.4 G/DL (ref 11.5–15.5)
LDL CHOLESTEROL CALCULATED: 120 MG/DL (ref 0–99)
LV EF: 78 %
LVEF MODALITY: NORMAL
MCH RBC QN AUTO: 29.1 PG (ref 26–35)
MCHC RBC AUTO-ENTMCNC: 31.5 % (ref 32–34.5)
MCV RBC AUTO: 92.6 FL (ref 80–99.9)
METER GLUCOSE: 129 MG/DL (ref 74–99)
METER GLUCOSE: 270 MG/DL (ref 74–99)
PDW BLD-RTO: 16.5 FL (ref 11.5–15)
PLATELET # BLD: 236 E9/L (ref 130–450)
PMV BLD AUTO: 11.5 FL (ref 7–12)
POTASSIUM REFLEX MAGNESIUM: 4.3 MMOL/L (ref 3.5–5)
RBC # BLD: 4.6 E12/L (ref 3.5–5.5)
SODIUM BLD-SCNC: 141 MMOL/L (ref 132–146)
TOTAL PROTEIN: 7.1 G/DL (ref 6.4–8.3)
TRIGL SERPL-MCNC: 351 MG/DL (ref 0–149)
VLDLC SERPL CALC-MCNC: 70 MG/DL
WBC # BLD: 4.9 E9/L (ref 4.5–11.5)

## 2020-09-04 PROCEDURE — A9500 TC99M SESTAMIBI: HCPCS | Performed by: RADIOLOGY

## 2020-09-04 PROCEDURE — 6360000002 HC RX W HCPCS: Performed by: INTERNAL MEDICINE

## 2020-09-04 PROCEDURE — 6370000000 HC RX 637 (ALT 250 FOR IP): Performed by: INTERNAL MEDICINE

## 2020-09-04 PROCEDURE — 96372 THER/PROPH/DIAG INJ SC/IM: CPT

## 2020-09-04 PROCEDURE — 82962 GLUCOSE BLOOD TEST: CPT

## 2020-09-04 PROCEDURE — 3430000000 HC RX DIAGNOSTIC RADIOPHARMACEUTICAL: Performed by: RADIOLOGY

## 2020-09-04 PROCEDURE — G0378 HOSPITAL OBSERVATION PER HR: HCPCS

## 2020-09-04 PROCEDURE — 93017 CV STRESS TEST TRACING ONLY: CPT

## 2020-09-04 PROCEDURE — 85027 COMPLETE CBC AUTOMATED: CPT

## 2020-09-04 PROCEDURE — 2580000003 HC RX 258: Performed by: EMERGENCY MEDICINE

## 2020-09-04 PROCEDURE — 78452 HT MUSCLE IMAGE SPECT MULT: CPT

## 2020-09-04 PROCEDURE — 83036 HEMOGLOBIN GLYCOSYLATED A1C: CPT

## 2020-09-04 PROCEDURE — 80061 LIPID PANEL: CPT

## 2020-09-04 PROCEDURE — 36415 COLL VENOUS BLD VENIPUNCTURE: CPT

## 2020-09-04 PROCEDURE — 80053 COMPREHEN METABOLIC PANEL: CPT

## 2020-09-04 RX ORDER — LEVOTHYROXINE SODIUM 0.05 MG/1
50 TABLET ORAL DAILY
Status: DISCONTINUED | OUTPATIENT
Start: 2020-09-05 | End: 2020-09-04 | Stop reason: HOSPADM

## 2020-09-04 RX ORDER — LEVOTHYROXINE SODIUM 0.05 MG/1
50 TABLET ORAL DAILY
Qty: 30 TABLET | Refills: 0 | Status: SHIPPED | OUTPATIENT
Start: 2020-09-05 | End: 2022-08-09

## 2020-09-04 RX ADMIN — METOPROLOL TARTRATE 50 MG: 50 TABLET, FILM COATED ORAL at 10:11

## 2020-09-04 RX ADMIN — ENOXAPARIN SODIUM 40 MG: 40 INJECTION SUBCUTANEOUS at 10:11

## 2020-09-04 RX ADMIN — REGADENOSON 0.4 MG: 0.08 INJECTION, SOLUTION INTRAVENOUS at 08:30

## 2020-09-04 RX ADMIN — SODIUM CHLORIDE, PRESERVATIVE FREE 10 ML: 5 INJECTION INTRAVENOUS at 10:11

## 2020-09-04 RX ADMIN — LEVOTHYROXINE SODIUM 150 MCG: 75 TABLET ORAL at 06:16

## 2020-09-04 RX ADMIN — Medication 10 MILLICURIE: at 07:15

## 2020-09-04 RX ADMIN — ASPIRIN 81 MG: 81 TABLET, CHEWABLE ORAL at 10:10

## 2020-09-04 RX ADMIN — GLIMEPIRIDE 4 MG: 4 TABLET ORAL at 06:16

## 2020-09-04 RX ADMIN — INSULIN LISPRO 6 UNITS: 100 INJECTION, SOLUTION INTRAVENOUS; SUBCUTANEOUS at 12:07

## 2020-09-04 RX ADMIN — ALOGLIPTIN 25 MG: 25 TABLET, FILM COATED ORAL at 10:10

## 2020-09-04 RX ADMIN — Medication 30 MILLICURIE: at 08:40

## 2020-09-04 RX ADMIN — METFORMIN HYDROCHLORIDE 500 MG: 500 TABLET, EXTENDED RELEASE ORAL at 10:10

## 2020-09-04 RX ADMIN — ACETAMINOPHEN 650 MG: 325 TABLET ORAL at 10:10

## 2020-09-04 ASSESSMENT — PAIN SCALES - GENERAL
PAINLEVEL_OUTOF10: 0
PAINLEVEL_OUTOF10: 8
PAINLEVEL_OUTOF10: 0

## 2020-09-04 ASSESSMENT — PAIN DESCRIPTION - PAIN TYPE: TYPE: OTHER (COMMENT);ACUTE PAIN

## 2020-09-04 ASSESSMENT — PAIN DESCRIPTION - DESCRIPTORS: DESCRIPTORS: DISCOMFORT;HEADACHE

## 2020-09-04 NOTE — CARE COORDINATION
Social Work/Discharge Planning:  Met with patient and her  Bertrand Pop and completed initial assessment. Explained Social Work role and discussed transition of care/discharge planning. Patient lives with her  in a two story house. PTA she is independent with no adaptive device. She has a glucometer, cane, and walker. Patient has no hhc or snf history. Patient PCP is Dr. Neal Crowley and pharmacy is [facility] in [place]. She plans to return home and denies any home care needs at this time. Will continue to follow.   Electronically signed by WINNIE Sarabia on 9/4/2020 at 11:39 AM

## 2020-09-04 NOTE — PROGRESS NOTES
Pharm stress test completed with physician, RN, nuclear medicine staff, and patient wearing procedure masks.

## 2020-09-04 NOTE — PROGRESS NOTES
Message sent to Dr. Ottoniel Santos via [employer] regarding stress test results and okay for discharge from cardiac standpoint.

## 2020-09-04 NOTE — PLAN OF CARE
Problem: Pain:  Goal: Pain level will decrease  Description: Pain level will decrease  9/4/2020 0157 by Ramana Dubose RN  Outcome: Met This Shift  9/3/2020 1356 by Eddie Schreiber RN  Outcome: Met This Shift  Goal: Control of acute pain  Description: Control of acute pain  9/4/2020 0157 by Ramana Dubose RN  Outcome: Met This Shift  9/3/2020 1356 by Eddie Schreiber RN  Outcome: Met This Shift  Goal: Control of chronic pain  Description: Control of chronic pain  9/4/2020 0157 by Ramana Dubose RN  Outcome: Met This Shift  9/3/2020 1356 by Eddie Schreiber RN  Outcome: Met This Shift     Problem: Falls - Risk of:  Goal: Will remain free from falls  Description: Will remain free from falls  9/4/2020 0157 by Ramana Dubose RN  Outcome: Met This Shift  9/3/2020 1356 by Eddie Schreiber RN  Outcome: Met This Shift  Goal: Absence of physical injury  Description: Absence of physical injury  9/4/2020 0157 by Ramana Dubose RN  Outcome: Met This Shift  9/3/2020 1356 by Eddie Schreiber RN  Outcome: Met This Shift     Problem: Gas Exchange - Impaired  Goal: Able to breathe comfortably  Description: Able to breathe comfortably  9/4/2020 0157 by Ramana Dubose RN  Outcome: Met This Shift  9/3/2020 1356 by Eddie Schreiber RN  Outcome: Met This Shift

## 2020-09-04 NOTE — PROGRESS NOTES
Internal Medicine Progress Note    Patient's name: Unique Wilson  : 1955  Admission date: 9/3/2020  Date of service: 2020   Room: [address] INTERMEDIATE  Primary care physician: Radha Burks MD    Greg Lozoya was seen and examined at bedside today s/p NM cardiac stress test this morning. She is sitting up comfortably in bed, alert and oriented. Denies any current chest pain or pressure. Admits to headache (responding to tylenol), fatigue, nausea and some mild abdominal discomfort but not pain. She has not vomited. Of note, she states she has not had a BM in ~3 days, and that she has not been adherent to her hypothyroid medication for a few months; she does follow with an endocrinologist who saw her two weeks ago for this. Review of Systems  There are no new complaints of shortness of breath, abdominal pain, vomiting, diarrhea.     Hospital Medications  Current Facility-Administered Medications   Medication Dose Route Frequency Provider Last Rate Last Dose    sodium chloride flush 0.9 % injection 10 mL  10 mL Intravenous 2 times per day Reij Rodriguez MD   10 mL at 20    sodium chloride flush 0.9 % injection 10 mL  10 mL Intravenous PRN Reji Rodriguez MD        glimepiride (AMARYL) tablet 4 mg  4 mg Oral BID AC Toney Freeman, DO   4 mg at 20    levothyroxine (SYNTHROID) tablet 150 mcg  150 mcg Oral Daily Toney Freeman, DO        metFORMIN (GLUCOPHAGE-XR) extended release tablet 500 mg  500 mg Oral Daily with breakfast Toney Freeman, DO        metoprolol tartrate (LOPRESSOR) tablet 50 mg  50 mg Oral Daily Toney Freeman, DO        PARoxetine (PAXIL) tablet 40 mg  40 mg Oral Nightly Toney E Volino, DO   40 mg at 20 220    pioglitazone (ACTOS) tablet 15 mg  15 mg Oral Nightly Toney E Volino, DO   15 mg at 20 220    acetaminophen (TYLENOL) tablet 650 mg  650 mg Oral Q6H PRN Toney IRBY Volino, DO   650 mg at 20 1737    Or    acetaminophen conjunctivae/corneas clear, sclera non icteric  Skin: color/texture/turgor normal, no rashes or lesions  Lungs: CTAB, no retractions/use of accessory muscles, no vocal fremitus, no rhonchi, no crackle, no rales  Heart: regular rate, regular rhythm, no murmur  Abdomen: soft, NT; bowel sounds normal; no masses,  no organomegaly  Extremities: atraumatic, no cyanosis, no edema  Neurologic: cranial nerves 2-12 grossly intact, no slurred speech. Most Recent Labs  Lab Results   Component Value Date    WBC 6.4 09/03/2020    HGB 13.5 09/03/2020    HCT 42.3 09/03/2020     09/03/2020     09/03/2020    K 4.3 09/03/2020    CL 98 09/03/2020    CREATININE 1.1 (H) 09/03/2020    BUN 21 09/03/2020    CO2 23 09/03/2020    GLUCOSE 178 (H) 09/03/2020    ALT 19 09/03/2020    AST 21 09/03/2020    .200 (H) 08/20/2020    LABA1C 9.0 (H) 08/20/2020    LABMICR <12.0 08/20/2020       XR CHEST PORTABLE   Final Result   Minimal atelectasis and pleural effusions in the left lung base. Assessment   Active Hospital Problems    Diagnosis    Chest pain [R07.9]     Priority: High    Seasonal allergies [J30.2]    Insomnia [G47.00]    Depression [F32.9]    Anxiety [F41.9]    Functional diarrhea [K59.1]    Hypothyroidism [E03.9]    GERD (gastroesophageal reflux disease) [K21.9]    Type 2 diabetes mellitus without complication ([facility] [address] [N32.8]    Hyperlipidemia [E78.5]    Osteoarthritis [M19.90]         Plan  · Chest pain: Observation. Telemetry. Cardiology consultation--defer need for further cardiac testing to cardiology. NM stress test performed with morning. CE's noted. ASA. Lipid panel noted. · DM, uncontrolled: Continue home DM meds-- adjust as needed. SSI. HbA1c 8.6. Consider Insulin as an OP and stopping PO DM meds to control GI sx. · Chronic N/V/D: CT abd/pel noted in Dec 2019. Hold PO Fe. · Follow labs  · DVT prophylaxis. · Please see orders for further management and care.   ·  for

## 2020-09-04 NOTE — PLAN OF CARE
Problem: Pain:  Goal: Pain level will decrease  Description: Pain level will decrease  9/4/2020 0954 by Abrahan Guillen RN  Outcome: Met This Shift     Problem: Pain:  Goal: Control of acute pain  Description: Control of acute pain  9/4/2020 0954 by Abrahan Guillen RN  Outcome: Met This Shift     Problem: Falls - Risk of:  Goal: Will remain free from falls  Description: Will remain free from falls  9/4/2020 0954 by Abrahan Guillen RN  Outcome: Met This Shift     Problem: Falls - Risk of:  Goal: Absence of physical injury  Description: Absence of physical injury  9/4/2020 0954 by Abrahan Guillen RN  Outcome: Met This Shift     Problem: Fluid and Electrolyte Imbalance  Goal: Fluid and electrolyte balance are achieved/maintained  Outcome: Met This Shift     Problem: Gas Exchange - Impaired  Goal: Able to breathe comfortably  Description: Able to breathe comfortably  9/4/2020 0954 by Abrahan Guillen RN  Outcome: Met This Shift

## 2020-09-04 NOTE — CONSULTS
[facility] [address] [place] [address]    Name: Kisha May    Age: 71 y.o. Date of Admission: 9/3/2020 10:21 AM    Date of Service: 9/4/2020    Reason for Consultation: chest pains    Referring Physician: Dr Marianna Castillo  Primary Care Physician: Chad Hassan MD    History of Present Illness: The patient is a 71y.o. year old female admitted for complaints of chest tightness shortness of breath and nausea. This has been going on for 1-2 months. No radiation of the discomfort to the neck arms or jaw. No diaphoresis. Denies nocturnal symptoms. No made worse or better by activities. Was moderate until yesterday. States was 10/10 and lasting all day. Feels fine this AM.Had bee off her synthroid several months and TSH in Aug was over 140. States just resumed her synthoid. Diabetic , states not well controlled. Last stress test she think was 7-8 years ago. Remote cath? - denies any history of MI. Had palpitations and states was seen by Dr Ricarda Lovett 30 years ago and put on BB.     Past Medical History:   Past Medical History:   Diagnosis Date    Acid reflux disease     Anxiety     began paxil 2013    Breast swelling     right - neg diagnostic mammo    Chest pain     Depression     Endometriosis 1986    laparoscopic    GERD (gastroesophageal reflux disease)     Hearing loss     Hyperlipidemia     Hypothyroidism     Insomnia     Kidney disease     Nausea vomiting and diarrhea     Osteoarthritis 6/8/2011     ref to Jaime    Palpitations     Postoperative anemia     Recurrent sinusitis     Seasonal allergies     Tinnitus     Type 2 diabetes mellitus without complication (HCC)     Type II or unspecified type diabetes mellitus without mention of complication, not stated as uncontrolled        Review of Systems:   Constitutional: No fever, chills, sweats  Cardiac: As per HPI  Pulmonary: No cough, wheeze, hemoptysis  HEENT: No visual disturbances, difficult swallowing  GI: No Forced sexual activity: Not on file   Other Topics Concern    Not on file   Social History Narrative    Not on file       Allergies: Allergies   Allergen Reactions    Azithromycin     Pcn [Penicillins] Rash    Sulfa Antibiotics Rash    Zyrtec [Cetirizine] Rash       Home Medications:  Prior to Admission medications    Medication Sig Start Date End Date Taking?  Authorizing Provider   PARoxetine (PAXIL) 40 MG tablet TAKE ONE TABLET BY MOUTH EVERY MORNING  Patient taking differently: Take 40 mg by mouth nightly  7/13/20  Yes Joselyn Arango MD   metoprolol tartrate (LOPRESSOR) 50 MG tablet TAKE ONE TABLET BY MOUTH EVERY DAY 7/13/20  Yes Joselyn Arango MD   atorvastatin (LIPITOR) 40 MG tablet TAKE ONE TABLET BY MOUTH EVERY DAY  Patient taking differently: Take 40 mg by mouth nightly  5/4/20  Yes Joselyn Arango MD   esomeprazole ([address]) 40 MG delayed release capsule TAKE ONE CAPSULE BY MOUTH [address] BREAKFAST  Patient taking differently: Take 40 mg by mouth nightly  5/4/20  Yes Joselyn Arango MD   vitamin D (ERGOCALCIFEROL) 1.25 MG (08638 UT) CAPS capsule Take 50,000 Units by mouth once a week wednesdays 4/13/20  Yes Historical Provider, MD   metFORMIN (GLUCOPHAGE-XR) 500 MG extended release tablet TAKE TWO TABLETS BY MOUTH TWO TIMES A DAY 10/29/19  Yes Historical Provider, MD   ferrous sulfate 325 (65 Fe) MG EC tablet Take 325 mg by mouth three times a week M-W-F   Yes Historical Provider, MD   Ascorbic Acid (VITAMIN C) 250 MG tablet Take 250 mg by mouth three times a week M-W-F   Yes Historical Provider, MD   glimepiride (AMARYL) 4 MG tablet Take 4 mg by mouth 2 times daily (before meals)   Yes Historical Provider, MD   Empagliflozin-Linagliptin (GLYXAMBI) 25-5 MG TABS Take 1 tablet by mouth daily   Yes Historical Provider, MD   pioglitazone (ACTOS) 15 MG tablet Take 15 mg by mouth nightly    Yes Historical Provider, MD   levothyroxine (SYNTHROID) 150 MCG tablet Take 150 mcg by mouth Daily    Yes intact  Skin: Intact, warm and dry, no rashes, no breakdown  Musculoskeletal: normal tone and strength in the upper and lower extremities bilaterally  Neuro: No focal deficits. Moves all extremities appropriately to command. Normal sensation in the upper and lower extremities bilaterally  Psychiatric: Cooperative, and normal affect    Intake/Output:  No intake or output data in the 24 hours ending 09/04/20 0805  No intake/output data recorded. Laboratory Tests:  Last 3 CBC:  Recent Labs     09/03/20  1041 09/04/20  0440   WBC 6.4 4.9   RBC 4.57 4.60   HGB 13.5 13.4   HCT 42.3 42.6   MCV 92.6 92.6   MCH 29.5 29.1   MCHC 31.9* 31.5*   RDW 16.4* 16.5*    236   MPV 11.2 11.5       Last 3 CMP:    Recent Labs     09/03/20  1041 09/04/20  0440    141   K 4.3 4.3   CL 98 104   CO2 23 25   BUN 21 20   CREATININE 1.1* 1.1*   GLUCOSE 178* 83   CALCIUM 10.2 10.4*   PROT 7.5 7.1   LABALBU 4.5 4.2   BILITOT 0.3 0.4   ALKPHOS 133* 122*   AST 21 21   ALT 19 18       Last 3 Mag/Phos:  No results for input(s): MG, PHOS in the last 72 hours. Last 3 CK, CKMB, Troponin  Recent Labs     09/03/20  1041 09/03/20  1845 09/03/20  2250   TROPONINI <0.01 <0.01 <0.01       Last 3 Pro-BNP:  No results for input(s): PROBNP in the last 72 hours. No results found for: PROBNP    Last 3 Glucose:     Recent Labs     09/03/20  1041 09/04/20  0440   GLUCOSE 178* 83       Last 3 Coags:  No results for input(s): PROTIME, INR, PTT in the last 72 hours.   No results found for: PROTIME, INR, PTT    Last 3 Lipid Panel:  Recent Labs     09/04/20  0440   LDLCALC 120*   HDL 56   TRIG 351*     Lab Results   Component Value Date    LDLCALC 120 (H) 09/04/2020    LDLCALC 116 (H) 08/20/2020    LDLCALC 79 08/31/2019     Lab Results   Component Value Date    HDL 56 09/04/2020    HDL 54 08/20/2020    HDL 39 08/31/2019     Lab Results   Component Value Date    TRIG 351 (H) 09/04/2020    TRIG 318 (H) 08/20/2020    TRIG 200 (H) 01/12/2019     No components found for: CHLPL    TSH:  No results for input(s): TSH in the last 72 hours. Lab Results   Component Value Date    .200 08/20/2020       ABGs:  No results for input(s): PH, PO2, PCO2, HCO3, BE, O2SAT in the last 72 hours. Lactic Acid:  No results for input(s): LACTA in the last 72 hours. Radiology:  RAD Results:  XR CHEST PORTABLE   Final Result   Minimal atelectasis and pleural effusions in the left lung base. NM Cardiac Stress Test Nuclear Imaging    (Results Pending)         EKG and Telemetry:  12-lead EKG personally reviewed and shows NSR low voltage, nonspecific T wave abnormality    Telemetry personally reviewed and shows sinus rhythm        ASSESSMENT / PLAN:    1. Atypical CP- normal troponins, nonspecific ECG- may be due to hypothyroid state. Will get lexiscan. Stress compliance with meds. Manage risk factors. Probably home if otherwise ok  2 Hypothyroid TSH on 8/20/20 147.2 was noncompliant with synthroid- just restarted  3 DM uncontrolled ,A1C 9 on 8/20/20 but probably worsened by being off her synthroid  4 Hyperlipidemia-with elevated trigycerides from DM/hypothyroid- on statin          Thank you for consultation.     Ashly Saha MD, [facility]  The [address] at [facility]    Electronically signed by Ashly Saha MD on 9/4/2020 at 8:05 AM

## 2020-09-04 NOTE — PROGRESS NOTES
Cardiology:  Reviewed stress test which was normal. Stable to eat and stable for home from a cardiac standpoint. No other cardiac workup planned. Work on risk factors, medical compliance. Will sign off.

## 2020-09-07 NOTE — DISCHARGE SUMMARY
Internal Medicine Discharge Summary    NAME: Kisha May :  1955  MRN:  [id number] Jevon King MD    ADMITTED: 9/3/2020   DISCHARGED: 2020  2:39 PM    ADMITTING PHYSICIAN: Elham att. providers found    CONSULTANT(S):   IP CONSULT TO INTERNAL MEDICINE  IP CONSULT TO CARDIOLOGY     ADMITTING DIAGNOSIS:   Chest pain [R07.9]  Chest pain [R07.9]     Please see H&P for further details    DISCHARGE DIAGNOSES:   Active Hospital Problems    Diagnosis    Chest pain [R07.9]     Priority: High    Seasonal allergies [J30.2]    Insomnia [G47.00]    Depression [F32.9]    Anxiety [F41.9]    Functional diarrhea [K59.1]    Hypothyroidism [E03.9]    GERD (gastroesophageal reflux disease) [K21.9]    Type 2 diabetes mellitus without complication (HCC) [C75.9]    Hyperlipidemia [E78.5]    Osteoarthritis [M19.90]       BRIEF HISTORY OF PRESENT ILLNESS: Kisha May is a 76 y.o. female patient of Chad Hassan MD who  has a past medical history of Acid reflux disease, Anxiety, Breast swelling, Chest pain, Depression, Endometriosis, GERD (gastroesophageal reflux disease), Hearing loss, Hyperlipidemia, Hypothyroidism, Insomnia, Kidney disease, Nausea vomiting and diarrhea, Osteoarthritis, Palpitations, Postoperative anemia, Recurrent sinusitis, Seasonal allergies, Tinnitus, Type 2 diabetes mellitus without complication ([address] and Type II or unspecified type diabetes mellitus without mention of complication, not stated as uncontrolled. who originally had concerns including Chest Pain (tightness, onset yesterday) and Shortness of Breath (onset yesterday ). at presentation on 9/3/2020, and was found to have Chest pain [R07.9]  Chest pain [R07.9] after workup. Please see H&P for further details. HOSPITAL COURSE:   The patient presented to the hospital with the chief complaint of Chest Pain (tightness, onset yesterday) and Shortness of Breath (onset yesterday ).  The patient was admitted to the [facility].     See progress note and imaging       BRIEF PHYSICAL EXAMINATION AND LABORATORIES ON DAY OF DISCHARGE:  VITALS:  /67   Pulse 93   Temp 99 °F (37.2 °C) (Oral)   Resp 16   Ht 5' 3\" (1.6 m)   Wt 202 lb (91.6 kg)   SpO2 98%   BMI 35.78 kg/m²     Please see note from the same day. LABS[de-identified]  Lab Results   Component Value Date     WBC 6.4 2020     HGB 13.5 2020     HCT 42.3 2020      2020      2020     K 4.3 2020     CL 98 2020     CREATININE 1.1 (H) 2020     BUN 21 2020     CO2 23 2020     GLUCOSE 178 (H) 2020     ALT 19 2020     AST 21 2020     .200 (H) 2020     LABA1C 9.0 (H) 2020     LABMICR <12.0 2020       Lab Results   Component Value Date    LABA1C 8.6 (H) 2020    LABA1C 9.0 (H) 2020    LABA1C 7.5 (H) 2019     No results found for: INR, PROTIME   Lab Results   Component Value Date    .200 (H) 2020    TSH 0.985 2019    TSH 0.345 2019     Lab Results   Component Value Date    TRIG 351 (H) 2020    TRIG 318 (H) 2020    TRIG 200 (H) 2019    HDL 56 2020    HDL 54 2020    HDL 39 2019    LDLCALC 120 (H) 2020    LDLCALC 116 (H) 2020    LDLCALC 79 2019     No results for input(s): MG in the last 72 hours. No results for input(s): CKTOTAL, CKMB, TROPONINI in the last 72 hours. No results for input(s): LACTA in the last 72 hours. IMAGING:  Xr Chest Portable    Result Date: 9/3/2020  Patient MRN:  [id number] : 1955 Age: 71 years Gender: Female Order Date:  9/3/2020 10:55 AM EXAM: XR CHEST PORTABLE One image INDICATION:  sob, chest pain sob, chest pain COMPARISON: None FINDINGS: Heart and the mediastinal structures are normal. There is minimal atelectasis in the left lung base. Small left pleural effusion is present. The remainder of lungs are clear.      Minimal atelectasis and pleural effusions in the left lung base. Nm Cardiac Stress Test Nuclear Imaging    Result Date: 2020  Patient MRN:  [id number] : 1955 Age: 71 years Gender: Female Order Date:  2020 7:48 AM EXAM: NM MYOCARDIAL SPECT REST EXERCISE OR RX Number of Images: 10 imaging sequence and composite views INDICATION: Chest pain with EKG abnormalities, in former smoker with diabetic and hyperlipidemia history COMPARISON: None TECHNIQUE: 11.6 mCi of Tc-99m MIBI was injected intravenously at rest and cardiac SPECT images were performed. In addition, 32.3 mCi of Tc-99m MIBI was injected intravenously at maximum stress by using Lexiscan chemical stress (0.4 mg/5 mL). Stress SPECT images and gated study were performed. FINDINGS: Reported vital signs were 121/81 mmHg and 83 BPM at baseline, with peak measurements of 142/75 mmHg and 104 BPM. Resting 3 minute recovery vital signs were reported at 132/79 mmHg and 89 BPM.. Initial  tomographic images show no significant motion artifact. Perfusion images demonstrate no reversible perfusion defect. Some decreased activity near the left ventricular base and the anterior septal wall may be artifact related to the sample volume. Wall motion is within normal limits. The end diastolic volume is 25 ml. The end systolic volume is 5 ml. The estimated ejection fraction is 78 %. 1. No reversible perfusion defect 2. Ejection fraction is 78 %. 3. No significant wall motion abnormality       MICROBIOLOGY:  BLOOD CX #1  No results for input(s): BC in the last 72 hours. BLOOD CX #2  No results for input(s): Lonzell Schlatter in the last 72 hours. TIP CULTURE  No results for input(s): CXCATHTIP in the last 72 hours. CULTURE, RESPIRATORY   No results for input(s): CULTRESP in the last 72 hours. RESPIRATORY SMEAR  No results for input(s): RESPSMEAR in the last 72 hours. DISPOSITION:  The patient's condition is fair.    The patient is being discharged to home    DISCHARGE MEDICATIONS:    Nelda Vora   Home Medication Instructions CSA:[id number]    Printed on:09/07/20 6602   Medication Information                      Ascorbic Acid (VITAMIN C) 250 MG tablet  Take 250 mg by mouth three times a week M-W-F             atorvastatin (LIPITOR) 40 MG tablet  TAKE ONE TABLET BY MOUTH EVERY DAY             Empagliflozin-Linagliptin (GLYXAMBI) 25-5 MG TABS  Take 1 tablet by mouth daily             esomeprazole (NEXIUM) 40 MG delayed release capsule  TAKE ONE CAPSULE BY MOUTH EVERY MORNING BEFORE BREAKFAST             glimepiride (AMARYL) 4 MG tablet  Take 4 mg by mouth 2 times daily (before meals)             levothyroxine (SYNTHROID) 50 MCG tablet  Take 1 tablet by mouth Daily             metFORMIN (GLUCOPHAGE-XR) 500 MG extended release tablet  TAKE TWO TABLETS BY MOUTH TWO TIMES A DAY             metoprolol tartrate (LOPRESSOR) 50 MG tablet  TAKE ONE TABLET BY MOUTH EVERY DAY             PARoxetine (PAXIL) 40 MG tablet  TAKE ONE TABLET BY MOUTH EVERY MORNING             pioglitazone (ACTOS) 15 MG tablet  Take 15 mg by mouth nightly              vitamin D (ERGOCALCIFEROL) 1.25 MG (26888 UT) CAPS capsule  Take 50,000 Units by mouth once a week wednesdays                 Discharge Medication List as of 9/4/2020  2:23 PM      CONTINUE these medications which have CHANGED    Details   levothyroxine (SYNTHROID) 50 MCG tablet Take 1 tablet by mouth Daily, Disp-30 tablet,R-0Normal           Discharge Medication List as of 9/4/2020  2:23 PM      STOP taking these medications       ferrous sulfate 325 (65 Fe) MG EC tablet Comments:   Reason for Stopping:             Discharge Medication List as of 9/4/2020  2:23 PM          INTERNAL MEDICINE FOLLOW UP/INSTRUCTIONS:  · Follow-up with primary care physician as directed in discharge paperwork.   · Consider DM med changes  · She needs to call endocrine ASAP for synthroid dose adjustments and diabetes med changes   · Please

## 2020-09-08 ENCOUNTER — TELEPHONE (OUTPATIENT)
Dept: PRIMARY CARE CLINIC | Age: 65
End: 2020-09-08

## 2020-09-08 NOTE — TELEPHONE ENCOUNTER
[facility] Trans[facility] Initial Follow Up Call    Outreach made within 2 business days of discharge: Yes    Patient: Charlie Patricio Patient : 1955   MRN: [id number]  Reason for Admission: There are no discharge diagnoses documented for the most recent discharge. Discharge Date: 20       Spoke with: Glenn Velasco    Discharge department/facility: [facility] Patient Contact:  Was patient able to fill all prescriptions: Yes  Was patient instructed to bring all medications to the follow-up visit: Yes  Is patient taking all medications as directed in the discharge summary? Yes  Does patient understand their discharge instructions: Yes  Does patient have questions or concerns that need addressed prior to 7-14 day follow up office visit: no      Pt would prefer to see Dr. Francis Hansen in [place]. She's willing to go to [place].  Appts for both offices have not be released, and I'm unable to schedule pt for their follow up from the hospital.  Scheduled appointment with PCP within 7-14 days    Follow Up  Future Appointments   Date Time Provider Zara Cordoba   10/28/2020  1:00 PM Shawn Caban MD [address] [place] [address]

## 2020-09-08 NOTE — TELEPHONE ENCOUNTER
Understood. Pt is scheduled for this Friday at 7:15 am, pt was called and notified of their appointment.

## 2020-09-11 ENCOUNTER — OFFICE VISIT (OUTPATIENT)
Dept: FAMILY MEDICINE CLINIC | Age: 65
End: 2020-09-11
Payer: MEDICARE

## 2020-09-11 VITALS
WEIGHT: 201.2 LBS | RESPIRATION RATE: 16 BRPM | SYSTOLIC BLOOD PRESSURE: 136 MMHG | OXYGEN SATURATION: 95 % | BODY MASS INDEX: 35.64 KG/M2 | HEART RATE: 83 BPM | DIASTOLIC BLOOD PRESSURE: 74 MMHG | TEMPERATURE: 97.3 F

## 2020-09-11 PROCEDURE — 99214 OFFICE O/P EST MOD 30 MIN: CPT | Performed by: INTERNAL MEDICINE

## 2020-09-11 PROCEDURE — 2022F DILAT RTA XM EVC RTNOPTHY: CPT | Performed by: INTERNAL MEDICINE

## 2020-09-11 PROCEDURE — G8427 DOCREV CUR MEDS BY ELIG CLIN: HCPCS | Performed by: INTERNAL MEDICINE

## 2020-09-11 PROCEDURE — G8417 CALC BMI ABV UP PARAM F/U: HCPCS | Performed by: INTERNAL MEDICINE

## 2020-09-11 PROCEDURE — 3017F COLORECTAL CA SCREEN DOC REV: CPT | Performed by: INTERNAL MEDICINE

## 2020-09-11 PROCEDURE — 4040F PNEUMOC VAC/ADMIN/RCVD: CPT | Performed by: INTERNAL MEDICINE

## 2020-09-11 PROCEDURE — 1111F DSCHRG MED/CURRENT MED MERGE: CPT | Performed by: INTERNAL MEDICINE

## 2020-09-11 PROCEDURE — 1090F PRES/ABSN URINE INCON ASSESS: CPT | Performed by: INTERNAL MEDICINE

## 2020-09-11 PROCEDURE — 1123F ACP DISCUSS/DSCN MKR DOCD: CPT | Performed by: INTERNAL MEDICINE

## 2020-09-11 PROCEDURE — 1036F TOBACCO NON-USER: CPT | Performed by: INTERNAL MEDICINE

## 2020-09-11 PROCEDURE — G8400 PT W/DXA NO RESULTS DOC: HCPCS | Performed by: INTERNAL MEDICINE

## 2020-09-11 PROCEDURE — 3052F HG A1C>EQUAL 8.0%<EQUAL 9.0%: CPT | Performed by: INTERNAL MEDICINE

## 2020-09-11 NOTE — PROGRESS NOTES
Post-Discharge Transitional Care Management Services or Hospital Follow Up      Lucero Russell [address]   YOB: 1955    Date of Office Visit:  9/11/2020  Date of Hospital Admission: 9/3/20  Date of Hospital Discharge: 9/4/20  Risk of hospital readmission (high >=14%.  Medium >=10%) :No data recorded    Care management risk score Rising risk (score 2-5) and Complex Care (Scores >=6): 1     Non face to face  following discharge, date last encounter closed (first attempt may have been earlier): 9/8/2020  9:13 AM    Call initiated 2 business days of discharge: Yes    Patient Active Problem List   Diagnosis    GERD (gastroesophageal reflux disease)    Type 2 diabetes mellitus without complication (HCC)    Osteoarthritis    Hyperlipidemia    Hypothyroidism    Functional diarrhea    Chest pain    Seasonal allergies    Insomnia    Depression    Anxiety       Allergies   Allergen Reactions    Azithromycin     Pcn [Penicillins] Rash    Sulfa Antibiotics Rash    Zyrtec [Cetirizine] Rash       Medications listed as ordered at the time of discharge from hospital   [address] Medication Instructions HAYDEN:    Printed on:09/11/20 7502   Medication Information                      Ascorbic Acid (VITAMIN C) 250 MG tablet  Take 250 mg by mouth three times a week M-W-F             atorvastatin (LIPITOR) 40 MG tablet  TAKE ONE TABLET BY MOUTH EVERY DAY             Empagliflozin-Linagliptin (GLYXAMBI) 25-5 MG TABS  Take 1 tablet by mouth daily             esomeprazole (NEXIUM) 40 MG delayed release capsule  TAKE ONE CAPSULE BY MOUTH EVERY MORNING BEFORE BREAKFAST             glimepiride (AMARYL) 4 MG tablet  Take 4 mg by mouth 2 times daily (before meals)             levothyroxine (SYNTHROID) 50 MCG tablet  Take 1 tablet by mouth Daily             metFORMIN (GLUCOPHAGE-XR) 500 MG extended release tablet  TAKE TWO TABLETS BY MOUTH TWO TIMES A DAY             metoprolol tartrate (LOPRESSOR) 50 presents with    Follow-Up from Hospital       History of Present illness - Follow up of Hospital diagnosis(es): Patient was admitted to the hospital with chest pain. Patient was found to have severe hypothyroidism. Hyperlipidemia was a consequence of this. Patient has had follow-up with endocrinology and she is now on 100 mcg of Synthroid. She is feeling somewhat better. The patient had stopped taking her thyroid medication several months ago for unknown reasons. She states that she \"just got out of the habit\". Patient has not had any further cardiac symptomatology. Her nuclear stress testing was totally normal including normal ejection fraction without evidence of infarct or ischemia. Inpatient course: Discharge summary reviewed- see chart. Interval history/Current status: See the above summary    A comprehensive review of systems was negative except for what was noted in the HPI. Vitals:    09/11/20 0720   BP: 136/74   Pulse: 83   Resp: 16   Temp: 97.3 °F (36.3 °C)   SpO2: 95%   Weight: 201 lb 3.2 oz (91.3 kg)     Body mass index is 35.64 kg/m². Wt Readings from Last 3 Encounters:   09/11/20 201 lb 3.2 oz (91.3 kg)   09/03/20 202 lb (91.6 kg)   04/28/20 185 lb (83.9 kg)     BP Readings from Last 3 Encounters:   09/11/20 136/74   09/04/20 128/67   04/28/20 112/64        Physical Exam:  Tympanic membranes are clear. No anterior or posterior cervical adenopathy. The lungs are clear to auscultation without wheeze rhonchi or rales. Heart is regular without murmur. Abdomen is benign. Extremities are without edema. Assessment/Plan:  There are no diagnoses linked to this encounter.     Holley Cabral was seen today for follow-up from hospital.    Diagnoses and all orders for this visit:    Type 2 diabetes mellitus without complication, without long-term current use of insulin ([address]  -     KS DISCHARGE MEDS RECONCILED W/ CURRENT OUTPATIENT MED LIST    Acquired hypothyroidism  -     KS DISCHARGE MEDS

## 2020-09-29 NOTE — TELEPHONE ENCOUNTER
This is is why I sent the message to Dr. Joseph Bergeron as his apt where booked up and I could not fit her into his schedule for [place] or [place] with in the 7-14 days after a hospital discharge please refer back to Dr. Joseph Bergeron and I's Messages I was advised to put the pt in at the 7:15  On this Friday. In Dr. Osito Cam message he advised to have the pt come early at 7:15am on that Friday Morning. I did as I was advised.

## 2020-10-09 ENCOUNTER — HOSPITAL ENCOUNTER (OUTPATIENT)
Age: 65
Discharge: HOME | End: 2020-10-09
Payer: MEDICARE

## 2020-10-09 LAB
ALBUMIN SERPL-MCNC: 4.2 G/DL (ref 3.5–5.2)
ALP BLD-CCNC: 146 U/L (ref 35–104)
ALT SERPL-CCNC: 21 U/L (ref 0–32)
ANION GAP SERPL CALCULATED.3IONS-SCNC: 10 MMOL/L (ref 7–16)
AST SERPL-CCNC: 14 U/L (ref 0–31)
BILIRUB SERPL-MCNC: 0.3 MG/DL (ref 0–1.2)
BUN BLDV-MCNC: 21 MG/DL (ref 8–23)
CALCIUM SERPL-MCNC: 9.6 MG/DL (ref 8.6–10.2)
CHLORIDE BLD-SCNC: 109 MMOL/L (ref 98–107)
CHOLESTEROL, TOTAL: 158 MG/DL (ref 0–199)
CO2: 25 MMOL/L (ref 22–29)
CREAT SERPL-MCNC: 1.1 MG/DL (ref 0.5–1)
CREATININE URINE: 77 MG/DL (ref 29–226)
GFR AFRICAN AMERICAN: >60
GFR NON-AFRICAN AMERICAN: 50 ML/MIN/1.73
GLUCOSE BLD-MCNC: 249 MG/DL (ref 74–99)
HBA1C MFR BLD: 8.6 % (ref 4–5.6)
HDLC SERPL-MCNC: 39 MG/DL
LDL CHOLESTEROL CALCULATED: 78 MG/DL (ref 0–99)
MICROALBUMIN UR-MCNC: <12 MG/L
MICROALBUMIN/CREAT UR-RTO: ABNORMAL (ref 0–30)
POTASSIUM SERPL-SCNC: 5.3 MMOL/L (ref 3.5–5)
SODIUM BLD-SCNC: 144 MMOL/L (ref 132–146)
T4 FREE: 1.28 NG/DL (ref 0.93–1.7)
TOTAL PROTEIN: 7.1 G/DL (ref 6.4–8.3)
TRIGL SERPL-MCNC: 203 MG/DL (ref 0–149)
TSH SERPL DL<=0.05 MIU/L-ACNC: 5.87 UIU/ML (ref 0.27–4.2)
VLDLC SERPL CALC-MCNC: 41 MG/DL

## 2020-10-09 PROCEDURE — 82570 ASSAY OF URINE CREATININE: CPT

## 2020-10-09 PROCEDURE — 80053 COMPREHEN METABOLIC PANEL: CPT

## 2020-10-09 PROCEDURE — 84439 ASSAY OF FREE THYROXINE: CPT

## 2020-10-09 PROCEDURE — 83036 HEMOGLOBIN GLYCOSYLATED A1C: CPT

## 2020-10-09 PROCEDURE — 83721 ASSAY OF BLOOD LIPOPROTEIN: CPT

## 2020-10-09 PROCEDURE — 84443 ASSAY THYROID STIM HORMONE: CPT

## 2020-10-09 PROCEDURE — 82044 UR ALBUMIN SEMIQUANTITATIVE: CPT

## 2020-10-09 PROCEDURE — 80061 LIPID PANEL: CPT

## 2020-10-09 PROCEDURE — 36415 COLL VENOUS BLD VENIPUNCTURE: CPT

## 2020-10-12 LAB
Lab: NORMAL
REPORT: NORMAL
THIS TEST SENT TO: NORMAL

## 2020-10-26 RX ORDER — METOPROLOL TARTRATE 50 MG/1
50 TABLET, FILM COATED ORAL DAILY
Qty: 90 TABLET | Refills: 0 | Status: SHIPPED
Start: 2020-10-26 | End: 2020-12-14

## 2020-10-26 RX ORDER — PAROXETINE HYDROCHLORIDE 40 MG/1
TABLET, FILM COATED ORAL
Qty: 90 TABLET | Refills: 0 | Status: SHIPPED
Start: 2020-10-26 | End: 2020-12-14

## 2020-10-26 RX ORDER — ESOMEPRAZOLE MAGNESIUM 40 MG/1
40 CAPSULE, DELAYED RELEASE ORAL NIGHTLY
Qty: 90 CAPSULE | Refills: 0 | Status: SHIPPED
Start: 2020-10-26 | End: 2020-12-14

## 2020-10-26 NOTE — TELEPHONE ENCOUNTER
Name of Medication(s) Requested:  Paxil, Metoprolol & Atorvastatin    Pharmacy Requested:   [facility]    Medication(s) pended? [x] Yes  [] No    Last Appointment:  9/11/2020    Future appts:  Future Appointments   Date Time Provider Zara Cordoba   1/15/2021  7:45 AM Uriah Hinson  [address] [place] [address]        Does patient need call back?   [] Yes  [x] No

## 2020-12-14 RX ORDER — ESOMEPRAZOLE MAGNESIUM 40 MG/1
CAPSULE, DELAYED RELEASE ORAL
Qty: 90 CAPSULE | Refills: 3 | Status: SHIPPED
Start: 2020-12-14 | End: 2021-09-27

## 2020-12-14 RX ORDER — PAROXETINE HYDROCHLORIDE 40 MG/1
TABLET, FILM COATED ORAL
Qty: 90 TABLET | Refills: 3 | Status: SHIPPED
Start: 2020-12-14 | End: 2021-09-27

## 2020-12-14 RX ORDER — METOPROLOL TARTRATE 50 MG/1
50 TABLET, FILM COATED ORAL DAILY
Qty: 90 TABLET | Refills: 3 | Status: SHIPPED
Start: 2020-12-14 | End: 2021-09-27

## 2021-02-05 ENCOUNTER — OFFICE VISIT (OUTPATIENT)
Dept: FAMILY MEDICINE CLINIC | Age: 66
End: 2021-02-05
Payer: MEDICARE

## 2021-02-05 VITALS
WEIGHT: 195 LBS | BODY MASS INDEX: 34.54 KG/M2 | SYSTOLIC BLOOD PRESSURE: 120 MMHG | OXYGEN SATURATION: 96 % | HEART RATE: 78 BPM | TEMPERATURE: 98 F | DIASTOLIC BLOOD PRESSURE: 70 MMHG

## 2021-02-05 DIAGNOSIS — E11.9 TYPE 2 DIABETES MELLITUS WITHOUT COMPLICATION, WITHOUT LONG-TERM CURRENT USE OF INSULIN (HCC): ICD-10-CM

## 2021-02-05 DIAGNOSIS — E03.9 ACQUIRED HYPOTHYROIDISM: ICD-10-CM

## 2021-02-05 DIAGNOSIS — K21.9 GASTROESOPHAGEAL REFLUX DISEASE WITHOUT ESOPHAGITIS: Primary | ICD-10-CM

## 2021-02-05 DIAGNOSIS — F41.9 ANXIETY: ICD-10-CM

## 2021-02-05 DIAGNOSIS — E78.2 MIXED HYPERLIPIDEMIA: ICD-10-CM

## 2021-02-05 DIAGNOSIS — F32.9 REACTIVE DEPRESSION: ICD-10-CM

## 2021-02-05 PROCEDURE — G8484 FLU IMMUNIZE NO ADMIN: HCPCS | Performed by: INTERNAL MEDICINE

## 2021-02-05 PROCEDURE — 4040F PNEUMOC VAC/ADMIN/RCVD: CPT | Performed by: INTERNAL MEDICINE

## 2021-02-05 PROCEDURE — 2022F DILAT RTA XM EVC RTNOPTHY: CPT | Performed by: INTERNAL MEDICINE

## 2021-02-05 PROCEDURE — 1036F TOBACCO NON-USER: CPT | Performed by: INTERNAL MEDICINE

## 2021-02-05 PROCEDURE — G8400 PT W/DXA NO RESULTS DOC: HCPCS | Performed by: INTERNAL MEDICINE

## 2021-02-05 PROCEDURE — 99214 OFFICE O/P EST MOD 30 MIN: CPT | Performed by: INTERNAL MEDICINE

## 2021-02-05 PROCEDURE — 1090F PRES/ABSN URINE INCON ASSESS: CPT | Performed by: INTERNAL MEDICINE

## 2021-02-05 PROCEDURE — 3046F HEMOGLOBIN A1C LEVEL >9.0%: CPT | Performed by: INTERNAL MEDICINE

## 2021-02-05 PROCEDURE — G8427 DOCREV CUR MEDS BY ELIG CLIN: HCPCS | Performed by: INTERNAL MEDICINE

## 2021-02-05 PROCEDURE — 1123F ACP DISCUSS/DSCN MKR DOCD: CPT | Performed by: INTERNAL MEDICINE

## 2021-02-05 PROCEDURE — 3017F COLORECTAL CA SCREEN DOC REV: CPT | Performed by: INTERNAL MEDICINE

## 2021-02-05 PROCEDURE — G8417 CALC BMI ABV UP PARAM F/U: HCPCS | Performed by: INTERNAL MEDICINE

## 2021-02-05 RX ORDER — ATORVASTATIN CALCIUM 40 MG/1
TABLET, FILM COATED ORAL
Qty: 90 TABLET | Refills: 1 | Status: SHIPPED
Start: 2021-02-05 | End: 2022-08-09 | Stop reason: SDUPTHER

## 2021-02-05 NOTE — PROGRESS NOTES
Patient has done well and she is now permanently moved with her daughter to [facility]. We did discuss possibly starting to taper the Paxil. Patient did have blood work today from her endocrinologist and I will look at this when it is available. She did have a significantly elevated hemoglobin A1c last time at 8.6. Thyroid dose was adjusted in October. Repeat TSH is going to be done today. Lipids were slightly elevated but her LDL was well controlled. Patient denies cardiac or respiratory symptoms. She is doing well with her knee replacements. ROS:  Const: General health stated as good. Eyes: Blepharospasm, improved  ENMT: Reports hearing loss of the left ear. . Reports allergies. CV: Reports chest pain, pain on exertion; hyperlipidemia and palpitations. Resp: Denies respiratory symptoms. GI: GI symptoms have improved. : Menopause 2008 Urinary: reports kidney disease. Musculo: Reports arthritis. Skin: Denies skin, hair and nail symptoms. Neuro: Denies neurologic symptoms. Psych: Improved mood with Paxil use and moved to [facility] [place]  Endocrine: Reports diabetes and hypothyroidism but denies polydipsia, polyphagia or polyuria. Hema/Lymph: Denies hematologic symptoms. Denies lymphatic symptoms.     Current Outpatient Medications:     atorvastatin (LIPITOR) 40 MG tablet, TAKE ONE TABLET BY MOUTH EVERY DAY, Disp: 90 tablet, Rfl: 1    metoprolol tartrate (LOPRESSOR) 50 MG tablet, TAKE 1 TABLET BY MOUTH  DAILY, Disp: 90 tablet, Rfl: 3    PARoxetine (PAXIL) 40 MG tablet, TAKE 1 TABLET BY MOUTH IN  THE MORNING, Disp: 90 tablet, Rfl: 3    esomeprazole (NEXIUM) 40 MG delayed release capsule, TAKE 1 CAPSULE BY MOUTH AT  NIGHT, Disp: 90 capsule, Rfl: 3    levothyroxine (SYNTHROID) 50 MCG tablet, Take 1 tablet by mouth Daily (Patient taking differently: Take 100 mcg by mouth Daily ), Disp: 30 tablet, Rfl: 0    vitamin D (ERGOCALCIFEROL) 1.25 MG (70298 UT) CAPS capsule, Take 50,000 Units by mouth once a week wednesdays, Disp: , Rfl:     metFORMIN (GLUCOPHAGE-XR) 500 MG extended release tablet, TAKE TWO TABLETS BY MOUTH TWO TIMES A DAY, Disp: , Rfl: 3    glimepiride (AMARYL) 4 MG tablet, Take 4 mg by mouth 2 times daily (before meals), Disp: , Rfl:     pioglitazone (ACTOS) 15 MG tablet, Take 15 mg by mouth nightly , Disp: , Rfl:     Ascorbic Acid (VITAMIN C) 250 MG tablet, Take 250 mg by mouth three times a week M-W-F, Disp: , Rfl:     Empagliflozin-Linagliptin (GLYXAMBI) 25-5 MG TABS, Take 1 tablet by mouth daily, Disp: , Rfl:   Allergies: Penicillin - rash  Sulfa - rash  Zyrtec - rash  z-suzanne - rash  PMH:  Health Maintenance:  Mammogram Screening - (11/24/2014)  Bone Density Test Screening - (4/27/2011)  EGD - 2002 GREEN  2005 LUIS  Colonoscopy - (2006) wnl  Mammogram - (2014)  Pneumonia Vaccination - 2005  Influenza Vaccination - (2016)  Tdap - (2/16/2011)  Eye Exams - YEARLY BRITTNEY  Hepatitis B Series - YEARS AGO  Pelvic/Pap Exam - (4/12/2012) pap & hpv neg  Bone Density Test - (4/27/2011) normal but low normal bone density  Stress Test - (3/2016) LEXISCAN-NORMAL  Medical Problems:  Type 2 Diabetes - DIET CONTROLLED 1995  BEGAN MEDS 2005 COROLLO  Hypothyroidism - 1987  Palpitations - FULL EVALUATION YESIHIJA 2000- BEGAN METOPROLOL  STRESS TEST 2005- ABNORMAL  HEART CATH 2005 NORMAL [facility]  GERD - EGD 2002 GREEN  EGD 2005 LUIS- REFERRED BACK 2/11  Hyperlipidemia - 2005 BEGAN STATINS  Seasonal Allergies - (2/16/2011) BEGAN FLONASE  Recurrent Sinusitis - NASAL SWAB TAKEN, MAY NEED IMMUNGLOBULIN STUDIES  Anxiety - (6/26/2013) BEGAN PAXIL  Depression, Reflux Disease  Osteoarthritis - (2017) KNEES- REFERRED TO BRIGIDO  Insomnia - (6/26/2013) BEGAN PAXIL-GOOD RESPONSE 7/31/13  Right Breast Swelling And Pain - NEGATIVE DIAGNOSTIC MAMMOGRAM/DR ARORA  Nausea/Vomiting/Diarrhea - PROBABLE SE FROM BYDUREON  Chest Pain - (2/17/2016) EKG/STRESS TEST/CHANGE TO METOPROLOL SUCCINATE  Kidney Disease - (2/1/2017) QUESTIONABLE CONTROL  Post-Op Anemia - (2017)  Tinnitus - REFERRED TO KATHERYN  Hearing Loss - (3/14/2018) KATHERYN  Surgical Hx:  Tubal Ligation  Endometrieosis - (142) laparoscopic  Cholecystectomy - (2012)  Knees-bilateral replacement  Reviewed, no changes. FH:  Father:  Liver Cancer. Mother:  Esophagus Cancer, Non Insulin Dependent Diabetes, Thyroid Disease. Sister 1:  Insulin Dependent Diabetes. Reviewed, no changes. SH:  Marital: Legal Status: . Personal Habits: Cigarette Use: Former Cigarette Smoker 2 Packs Daily, Pack Years - 27, Quit -  17. Alcohol: Never used alcohol. Daily Caffeine: Consumes on average 1 soda per day. Exercise  Type: Walks 3 times a week. Reviewed, no changes. Date: 2019  Was the patient queried about smoking behavior? Yes No  Eaton Bill  1955 Page #3  Does the patient currently smoke? Smoking: Patient is a former smoker. Vitals:    21 0844   BP: 120/70   Pulse: 78   Temp: 98 °F (36.7 °C)   SpO2: 96%     Exam:  Const: Appears healthy, well developed and well nourished. Appears obese. Eyes: EOMI in both eyes. PERRL. ENMT: External ears WNL. TM's intact and middle ear well aerated. External nose WNL. Neck: Supple and symmetric. Palpation reveals no adenopathy. No masses appreciated. Thyroid  exhibits no nodule or thyromegaly. No JVD. Resp: Respirations are unlabored. Respiration rate is normal. Auscultate mildly decreased airflow. No  rales, rhonchi or wheezes appreciated over the lungs bilaterally. CV: Rhythm is regular. S1 is normal. S2 is normal. Carotids: no bruits. Abdominal aorta: not  palpable. Pedal pulses: 2+ and equal bilaterally. Extremities: No clubbing, cyanosis or edema. Abdomen: Positive bowel sounds soft. No CVA tenderness bilaterally. No rebound or guarding. No distinct mass. Musculo: Walks with a normal gait. Upper Extremities: Physiologic range of motion bilaterally.   Lower  Extremities: DJD changes of the lower extremities. Skin: Skin is warm and dry without rash. Neuro: Alert and oriented x3. Mood is normal. Affect is normal. Speech is articulate and fluent. Reflexes: DTR's are intact bilaterally. Psych: Patient's attitude is cooperative. Patient's affect is appropriate. Judgement is realistic. Insight  is appropriate. Diagnoses and all orders for this visit:    Gastroesophageal reflux disease without esophagitis    Acquired hypothyroidism    Type 2 diabetes mellitus without complication, without long-term current use of insulin (HCC)    Anxiety    Reactive depression    Mixed hyperlipidemia    Other orders  -     atorvastatin (LIPITOR) 40 MG tablet; TAKE ONE TABLET BY MOUTH EVERY DAY    Patient is considering Covid vaccination and I have given her some information regarding this. Patient did get a flu vaccine in the fall. She is due for colonoscopy and mammography and these will need to be discussed next visit. I will review the lab work when it becomes available. She is to be seen back in 6 months.

## 2021-02-05 NOTE — PROGRESS NOTES
Post-Discharge Transitional Care Management Services or Hospital Follow Up      Lucero Russell [address]   YOB: 1955    Date of Office Visit:  2/5/2021  Date of Hospital Admission: 9/3/20  Date of Hospital Discharge: 9/4/20  Risk of hospital readmission (high >=14%.  Medium >=10%) :No data recorded    Care management risk score Rising risk (score 2-5) and Complex Care (Scores >=6): 1     Non face to face  following discharge, date last encounter closed (first attempt may have been earlier): *No documented post hospital discharge outreach found in the last 14 days    Call initiated 2 business days of discharge: *No response recorded in the last 14 days    Patient Active Problem List   Diagnosis    GERD (gastroesophageal reflux disease)    Type 2 diabetes mellitus without complication (HCC)    Osteoarthritis    Hyperlipidemia    Hypothyroidism    Functional diarrhea    Chest pain    Seasonal allergies    Insomnia    Depression    Anxiety       Allergies   Allergen Reactions    Azithromycin     Pcn [Penicillins] Rash    Sulfa Antibiotics Rash    Zyrtec [Cetirizine] Rash       Medications listed as ordered at the time of discharge from hospital   [address] Medication Instructions HAYDEN:    Printed on:02/05/21 6906   Medication Information                      Ascorbic Acid (VITAMIN C) 250 MG tablet  Take 250 mg by mouth three times a week M-W-F             atorvastatin (LIPITOR) 40 MG tablet  TAKE ONE TABLET BY MOUTH EVERY DAY             Empagliflozin-Linagliptin (GLYXAMBI) 25-5 MG TABS  Take 1 tablet by mouth daily             esomeprazole (NEXIUM) 40 MG delayed release capsule  TAKE 1 CAPSULE BY MOUTH AT  NIGHT             glimepiride (AMARYL) 4 MG tablet  Take 4 mg by mouth 2 times daily (before meals)             levothyroxine (SYNTHROID) 50 MCG tablet  Take 1 tablet by mouth Daily             metFORMIN (GLUCOPHAGE-XR) 500 MG extended release tablet  TAKE TWO TABLETS BY MOUTH TWO TIMES A DAY             metoprolol tartrate (LOPRESSOR) 50 MG tablet  TAKE 1 TABLET BY MOUTH  DAILY             PARoxetine (PAXIL) 40 MG tablet  TAKE 1 TABLET BY MOUTH IN  THE MORNING             pioglitazone (ACTOS) 15 MG tablet  Take 15 mg by mouth nightly              vitamin D (ERGOCALCIFEROL) 1.25 MG (97142 UT) CAPS capsule  Take 50,000 Units by mouth once a week wednesdays                   Medications marked \"taking\" at this time  Outpatient Medications Marked as Taking for the 2/5/21 encounter (Office Visit) with Karan Gutierrez MD   Medication Sig Dispense Refill    metoprolol tartrate (LOPRESSOR) 50 MG tablet TAKE 1 TABLET BY MOUTH  DAILY 90 tablet 3    PARoxetine (PAXIL) 40 MG tablet TAKE 1 TABLET BY MOUTH IN  THE MORNING 90 tablet 3    esomeprazole (NEXIUM) 40 MG delayed release capsule TAKE 1 CAPSULE BY MOUTH AT  NIGHT 90 capsule 3    levothyroxine (SYNTHROID) 50 MCG tablet Take 1 tablet by mouth Daily (Patient taking differently: Take 100 mcg by mouth Daily ) 30 tablet 0    vitamin D (ERGOCALCIFEROL) 1.25 MG (88041 UT) CAPS capsule Take 50,000 Units by mouth once a week wednesdays      metFORMIN (GLUCOPHAGE-XR) 500 MG extended release tablet TAKE TWO TABLETS BY MOUTH TWO TIMES A DAY  3    glimepiride (AMARYL) 4 MG tablet Take 4 mg by mouth 2 times daily (before meals)      pioglitazone (ACTOS) 15 MG tablet Take 15 mg by mouth nightly           Medications patient taking as of now reconciled against medications ordered at time of hospital discharge: Yes    No chief complaint on file. History of Present illness - Follow up of Hospital diagnosis(es): Patient was admitted to the hospital with chest pain. Patient was found to have severe hypothyroidism. Hyperlipidemia was a consequence of this. Patient has had follow-up with endocrinology and she is now on 100 mcg of Synthroid. She is feeling somewhat better.   The patient had stopped taking her thyroid medication several months

## 2021-02-24 LAB
AVERAGE GLUCOSE: NORMAL
HBA1C MFR BLD: 9.3 %

## 2021-06-15 LAB
AVERAGE GLUCOSE: NORMAL
HBA1C MFR BLD: 8.5 %

## 2021-08-06 ENCOUNTER — OFFICE VISIT (OUTPATIENT)
Dept: FAMILY MEDICINE CLINIC | Age: 66
End: 2021-08-06
Payer: MEDICARE

## 2021-08-06 VITALS
WEIGHT: 198 LBS | BODY MASS INDEX: 35.07 KG/M2 | DIASTOLIC BLOOD PRESSURE: 80 MMHG | OXYGEN SATURATION: 95 % | SYSTOLIC BLOOD PRESSURE: 120 MMHG | TEMPERATURE: 97.5 F | HEART RATE: 74 BPM

## 2021-08-06 DIAGNOSIS — M79.622 LEFT UPPER ARM PAIN: Primary | ICD-10-CM

## 2021-08-06 DIAGNOSIS — Z12.11 COLON CANCER SCREENING: ICD-10-CM

## 2021-08-06 DIAGNOSIS — Z12.31 BREAST CANCER SCREENING BY MAMMOGRAM: ICD-10-CM

## 2021-08-06 PROCEDURE — 1090F PRES/ABSN URINE INCON ASSESS: CPT | Performed by: INTERNAL MEDICINE

## 2021-08-06 PROCEDURE — G8400 PT W/DXA NO RESULTS DOC: HCPCS | Performed by: INTERNAL MEDICINE

## 2021-08-06 PROCEDURE — 3017F COLORECTAL CA SCREEN DOC REV: CPT | Performed by: INTERNAL MEDICINE

## 2021-08-06 PROCEDURE — 4040F PNEUMOC VAC/ADMIN/RCVD: CPT | Performed by: INTERNAL MEDICINE

## 2021-08-06 PROCEDURE — 99213 OFFICE O/P EST LOW 20 MIN: CPT | Performed by: INTERNAL MEDICINE

## 2021-08-06 PROCEDURE — G8417 CALC BMI ABV UP PARAM F/U: HCPCS | Performed by: INTERNAL MEDICINE

## 2021-08-06 PROCEDURE — 1123F ACP DISCUSS/DSCN MKR DOCD: CPT | Performed by: INTERNAL MEDICINE

## 2021-08-06 PROCEDURE — 1036F TOBACCO NON-USER: CPT | Performed by: INTERNAL MEDICINE

## 2021-08-06 PROCEDURE — G8427 DOCREV CUR MEDS BY ELIG CLIN: HCPCS | Performed by: INTERNAL MEDICINE

## 2021-08-06 RX ORDER — HYDROCODONE BITARTRATE AND ACETAMINOPHEN 7.5; 325 MG/1; MG/1
1 TABLET ORAL EVERY 6 HOURS PRN
Qty: 28 TABLET | Refills: 0 | Status: SHIPPED | OUTPATIENT
Start: 2021-08-06 | End: 2021-08-13

## 2021-08-06 NOTE — PROGRESS NOTES
Patient had her second Covid vaccine on July 13. She developed a rash and swelling of her left upper extremity. No other significant symptomatology. Patient just recently was evaluated by her endocrinologist.  Her thyroid dose was adjusted. She does have repeat testing in early October. Patient has not had mammography in quite some time and I have asked her to get this completed. Patient is way overdue for colonoscopy. She does agree to have this done by Dr. Gilbert Gomez. The patient is enjoying her prison. She is moved to [place] with her daughter. ROS:  Const: General health stated as good. Eyes: Blepharospasm, improved  ENMT: . Pari Ferrell Reports allergies. CV: Reports chest pain, pain on exertion; hyperlipidemia and palpitations. Resp: Denies respiratory symptoms. GI: GI symptoms have improved. : Menopause 2008 Urinary: reports kidney disease. Musculo: Reports arthritis. Skin: Denies skin, hair and nail symptoms. Neuro: Denies neurologic symptoms. Psych: Improved mood with Paxil use and moved to [facility] [place]  Endocrine: Reports diabetes and hypothyroidism but denies polydipsia, polyphagia or polyuria. Hema/Lymph: Denies hematologic symptoms. Denies lymphatic symptoms.     Current Outpatient Medications:     Insulin Glargine (TOUJEO SOLOSTAR SC), Inject into the skin 30 units daily, Disp: , Rfl:     atorvastatin (LIPITOR) 40 MG tablet, TAKE ONE TABLET BY MOUTH EVERY DAY, Disp: 90 tablet, Rfl: 1    metoprolol tartrate (LOPRESSOR) 50 MG tablet, TAKE 1 TABLET BY MOUTH  DAILY, Disp: 90 tablet, Rfl: 3    PARoxetine (PAXIL) 40 MG tablet, TAKE 1 TABLET BY MOUTH IN  THE MORNING, Disp: 90 tablet, Rfl: 3    esomeprazole (NEXIUM) 40 MG delayed release capsule, TAKE 1 CAPSULE BY MOUTH AT  NIGHT, Disp: 90 capsule, Rfl: 3    levothyroxine (SYNTHROID) 50 MCG tablet, Take 1 tablet by mouth Daily (Patient taking differently: Take 50 mcg by mouth Daily One daily, half on Sunday), Disp: 30 tablet, Rfl: 0   vitamin D (ERGOCALCIFEROL) 1.25 MG (65533 UT) CAPS capsule, Take 50,000 Units by mouth once a week wednesdays, Disp: , Rfl:     metFORMIN (GLUCOPHAGE-XR) 500 MG extended release tablet, TAKE TWO TABLETS BY MOUTH TWO TIMES A DAY, Disp: , Rfl: 3    glimepiride (AMARYL) 4 MG tablet, Take 4 mg by mouth 2 times daily (before meals), Disp: , Rfl:     Empagliflozin-Linagliptin (GLYXAMBI) 25-5 MG TABS, Take 1 tablet by mouth daily, Disp: , Rfl:     pioglitazone (ACTOS) 15 MG tablet, Take 15 mg by mouth nightly , Disp: , Rfl:     Ascorbic Acid (VITAMIN C) 250 MG tablet, Take 250 mg by mouth three times a week M-W-F (Patient not taking: Reported on 8/6/2021), Disp: , Rfl:   Allergies: Penicillin - rash  Sulfa - rash  Zyrtec - rash  z-suzanne - rash  PMH:  Health Maintenance:  Mammogram Screening - 8/6/2021 slip given  Bone Density Test Screening - (4/27/2011)  EGD - 2002 GREEN  2005 LUIS  Colonoscopy - (2006) wnl  Mammogram - (2014)  Pneumonia Vaccination - 2005  Influenza Vaccination - (2016)  Tdap - (2/16/2011)  Eye Exams - YEARLY JAYMIE  Hepatitis B Series - YEARS AGO  Pelvic/Pap Exam - (4/12/2012) pap & hpv neg  Bone Density Test - (4/27/2011) normal but low normal bone density  Stress Test - (3/2016) LEXISCAN-NORMAL  COVID vaccine 7/13/2021  Medical Problems:  Type 2 Diabetes - DIET CONTROLLED 1995  BEGAN MEDS 2005 COROLLO  Hypothyroidism - 1987  Palpitations - FULL EVALUATION KALYAN 2000- BEGAN METOPROLOL  STRESS TEST 2005- ABNORMAL  HEART CATH 2005 NORMAL [facility]  GERD - EGD 2002 GREEN  EGD 2005 LUIS- REFERRED BACK 2/11  Hyperlipidemia - 2005 BEGAN STATINS  Seasonal Allergies - (2/16/2011) BEGAN FLONASE  Recurrent Sinusitis - NASAL SWAB TAKEN, MAY NEED IMMUNGLOBULIN STUDIES  Anxiety - (6/26/2013) BEGAN PAXIL  Depression, Reflux Disease  Osteoarthritis - (2017) KNEES- REFERRED TO BRIGIDO  Insomnia - (6/26/2013) BEGAN PAXIL-GOOD RESPONSE 7/31/13  Right Breast Swelling And Pain - NEGATIVE DIAGNOSTIC MAMMOGRAM/ normal gait. Upper Extremities: Physiologic range of motion bilaterally. Swelling of the deltoid muscle extending down into the mid humeral area on the left side. No fluctuance or erythema. Lower  Extremities: DJD changes of the lower extremities. Skin: Skin is warm and dry without rash. Neuro: Alert and oriented x3. Mood is normal. Affect is normal. Speech is articulate and fluent. Reflexes: DTR's are intact bilaterally. Psych: Patient's attitude is cooperative. Patient's affect is appropriate. Judgement is realistic. Insight  is appropriate. Tiffanie Yap was seen today for 6 month follow-up and shoulder pain. Diagnoses and all orders for this visit:    Left upper arm pain  -     HYDROcodone-acetaminophen (NORCO) 7.5-325 MG per tablet; Take 1 tablet by mouth every 6 hours as needed for Pain for up to 7 days. Intended supply: 7 days. Take lowest dose possible to manage pain    Breast cancer screening by mammogram  -     [facility] DIGITAL SCREEN W OR WO CAD BILATERAL; Future    Colon cancer screening  -     External Referral To Gastroenterology    Patient is overdue for screening. Patient's thyroid is just recently been been adjusted. She remains on fairly high dose Paxil. Perhaps this can be adjusted over the next 6 months. Reflux is well controlled.

## 2021-09-16 ENCOUNTER — OFFICE VISIT (OUTPATIENT)
Dept: FAMILY MEDICINE CLINIC | Age: 66
End: 2021-09-16
Payer: MEDICARE

## 2021-09-16 ENCOUNTER — TELEPHONE (OUTPATIENT)
Dept: FAMILY MEDICINE CLINIC | Age: 66
End: 2021-09-16

## 2021-09-16 VITALS
WEIGHT: 198 LBS | OXYGEN SATURATION: 97 % | BODY MASS INDEX: 35.08 KG/M2 | DIASTOLIC BLOOD PRESSURE: 74 MMHG | HEIGHT: 63 IN | TEMPERATURE: 97.2 F | SYSTOLIC BLOOD PRESSURE: 110 MMHG | HEART RATE: 82 BPM

## 2021-09-16 DIAGNOSIS — M54.12 CERVICAL RADICULITIS: ICD-10-CM

## 2021-09-16 DIAGNOSIS — M25.512 LEFT SHOULDER PAIN, UNSPECIFIED CHRONICITY: ICD-10-CM

## 2021-09-16 DIAGNOSIS — M54.2 NECK PAIN: Primary | ICD-10-CM

## 2021-09-16 PROCEDURE — 1123F ACP DISCUSS/DSCN MKR DOCD: CPT | Performed by: INTERNAL MEDICINE

## 2021-09-16 PROCEDURE — 1090F PRES/ABSN URINE INCON ASSESS: CPT | Performed by: INTERNAL MEDICINE

## 2021-09-16 PROCEDURE — G8427 DOCREV CUR MEDS BY ELIG CLIN: HCPCS | Performed by: INTERNAL MEDICINE

## 2021-09-16 PROCEDURE — 4040F PNEUMOC VAC/ADMIN/RCVD: CPT | Performed by: INTERNAL MEDICINE

## 2021-09-16 PROCEDURE — 1036F TOBACCO NON-USER: CPT | Performed by: INTERNAL MEDICINE

## 2021-09-16 PROCEDURE — 96372 THER/PROPH/DIAG INJ SC/IM: CPT | Performed by: INTERNAL MEDICINE

## 2021-09-16 PROCEDURE — G8400 PT W/DXA NO RESULTS DOC: HCPCS | Performed by: INTERNAL MEDICINE

## 2021-09-16 PROCEDURE — 3017F COLORECTAL CA SCREEN DOC REV: CPT | Performed by: INTERNAL MEDICINE

## 2021-09-16 PROCEDURE — G8417 CALC BMI ABV UP PARAM F/U: HCPCS | Performed by: INTERNAL MEDICINE

## 2021-09-16 PROCEDURE — 99213 OFFICE O/P EST LOW 20 MIN: CPT | Performed by: INTERNAL MEDICINE

## 2021-09-16 RX ORDER — PREDNISONE 10 MG/1
TABLET ORAL
Qty: 12 TABLET | Refills: 0 | Status: SHIPPED | OUTPATIENT
Start: 2021-09-16 | End: 2021-09-22

## 2021-09-16 RX ORDER — KETOROLAC TROMETHAMINE 30 MG/ML
30 INJECTION, SOLUTION INTRAMUSCULAR; INTRAVENOUS ONCE
Status: COMPLETED | OUTPATIENT
Start: 2021-09-16 | End: 2021-09-16

## 2021-09-16 RX ADMIN — KETOROLAC TROMETHAMINE 30 MG: 30 INJECTION, SOLUTION INTRAMUSCULAR; INTRAVENOUS at 11:51

## 2021-09-17 ASSESSMENT — ENCOUNTER SYMPTOMS: COLOR CHANGE: 0

## 2021-09-17 NOTE — PROGRESS NOTES
[address] [facility] mcTEL      21  Essie Laughlin : 1955 Sex: female  Age: 78 y.o. Chief Complaint   Patient presents with    Shoulder Pain     left shoulder pain; down into arm, fingers tingling, pain between shoulder blades; hurts up into her neck; patient states that after she received her 2nd covid shot, she moved and was lifting some pretty heavy stuff       HPI  Patient presents to express care today complaining of left shoulder pain along with neck and left arm pain states the pain goes down the arm with some tingling to the fingers. States this all started after her second Covid injection during which time she states she was moving and doing some heavy lifting as well. This all occurred back in July. States she was seen and told that this should all resolve but has not to this point. He has been taking some over-the-counter pain medication without much relief. Denies any numbness or weakness. Has not had an issue with that arm in the past.        Review of Systems   Constitutional: Negative for chills and fever. Musculoskeletal: Positive for neck pain. See above   Skin: Negative for color change and rash. Neurological: Negative for weakness, numbness and headaches. REST OF PERTINENT ROS GONE OVER AND WAS NEGATIVE. Current Outpatient Medications:     predniSONE (DELTASONE) 10 MG tablet, Take 3 tablets by mouth daily for 2 days, THEN 2 tablets daily for 2 days, THEN 1 tablet daily for 2 days. , Disp: 12 tablet, Rfl: 0    Insulin Glargine (TOUJEO SOLOSTAR SC), Inject into the skin 30 units daily, Disp: , Rfl:     atorvastatin (LIPITOR) 40 MG tablet, TAKE ONE TABLET BY MOUTH EVERY DAY, Disp: 90 tablet, Rfl: 1    metoprolol tartrate (LOPRESSOR) 50 MG tablet, TAKE 1 TABLET BY MOUTH  DAILY, Disp: 90 tablet, Rfl: 3    PARoxetine (PAXIL) 40 MG tablet, TAKE 1 TABLET BY MOUTH IN  THE MORNING, Disp: 90 tablet, Rfl: 3    esomeprazole (NEXIUM) 40 MG delayed GASTROINTESTINAL ENDOSCOPY       Family History   Problem Relation Age of Onset   Nick Favorite Cancer Mother         esophageal    Diabetes Mother     Thyroid Disease Mother     Cancer Father         liver    Diabetes Sister      Social History     Socioeconomic History    Marital status:      Spouse name: Not on file    Number of children: Not on file    Years of education: Not on file    Highest education level: Not on file   Occupational History    Not on file   Tobacco Use    Smoking status: Former Smoker     Packs/day: 2.00     Years: 30.00     Pack years: 60.00     Start date:      Quit date: 1995     Years since quittin.8    Smokeless tobacco: Never Used   Vaping Use    Vaping Use: Never used   Substance and Sexual Activity    Alcohol use: Not Currently     Comment: rare    Drug use: Never    Sexual activity: Not on file   Other Topics Concern    Not on file   Social History Narrative    Not on file     Social Determinants of Health     Financial Resource Strain:     Difficulty of Paying Living Expenses:    Food Insecurity:     Worried About Running Out of Food in the Last Year:     [address] in the Last Year:    Transportation Needs:     Lack of Transportation (Medical):      Lack of Transportation (Non-Medical):    Physical Activity:     Days of Exercise per Week:     Minutes of Exercise per Session:    Stress:     Feeling of Stress :    Social Connections:     Frequency of Communication with Friends and Family:     Frequency of Social Gatherings with Friends and Family:     Attends Jew Services:     Active Member of Clubs or Organizations:     Attends Club or Organization Meetings:     Marital Status:    Intimate Partner Violence:     Fear of Current or Ex-Partner:     Emotionally Abused:     Physically Abused:     Sexually Abused:        Vitals:    21 1101   BP: 110/74   Pulse: 82   Temp: 97.2 °F (36.2 °C)   TempSrc: Temporal   SpO2: 97% Weight: 198 lb (89.8 kg)   Height: 5' 3\" (1.6 m)       Physical Exam  Vitals and nursing note reviewed. Constitutional:       Comments: Uncomfortable with the pain   Neck:      Comments: She did have some reproducible tenderness to palpation along the left cervical paraspinal region and posterior shoulder. Musculoskeletal:         General: Tenderness present. No swelling or deformity. Normal range of motion. Cervical back: Normal range of motion and neck supple. Tenderness present. Comments: See above   Skin:     General: Skin is warm and dry. Findings: No bruising, erythema or rash. Neurological:      General: No focal deficit present. Mental Status: She is alert and oriented to person, place, and time. Psychiatric:         Mood and Affect: Mood normal.         Behavior: Behavior normal.         Thought Content: Thought content normal.         Judgment: Judgment normal.               Assessment and Plan:  Rand Vázquez was seen today for shoulder pain. Diagnoses and all orders for this visit:    Neck pain  -     XR SHOULDER LEFT (MIN 2 VIEWS); Future  -     XR CERVICAL SPINE (2-3 VIEWS); Future    Left shoulder pain, unspecified chronicity  -     XR SHOULDER LEFT (MIN 2 VIEWS); Future  -     XR CERVICAL SPINE (2-3 VIEWS); Future  -     Slings    Cervical radiculitis  -     XR SHOULDER LEFT (MIN 2 VIEWS); Future  -     XR CERVICAL SPINE (2-3 VIEWS); Future    Other orders  -     ketorolac (TORADOL) injection 30 mg  -     predniSONE (DELTASONE) 10 MG tablet; Take 3 tablets by mouth daily for 2 days, THEN 2 tablets daily for 2 days, THEN 1 tablet daily for 2 days. Plan: Trial of prednisone taper dose. Toradol injection. X-ray C-spine and left shoulder. Sling was placed. Follow-up with PCP. Consideration for complex regional pain syndrome post injection? ?  Consideration for physical medicine referral.  Notify us if not improving. No follow-ups on file.     Seen By:  Gavi Lama MD      *Document was created using voice recognition software. Note was reviewed however may contain grammatical errors.

## 2021-09-20 NOTE — TELEPHONE ENCOUNTER
Called and notified the patient about her xrays showing Arthritic/degenerative changes to both the cervical spine and the shoulder area. No acute process. .  Patient verbalized understanding and stated that she is feeling better

## 2021-09-26 DIAGNOSIS — F32.9 REACTIVE DEPRESSION: ICD-10-CM

## 2021-09-27 RX ORDER — METOPROLOL TARTRATE 50 MG/1
50 TABLET, FILM COATED ORAL DAILY
Qty: 90 TABLET | Refills: 3 | Status: SHIPPED | OUTPATIENT
Start: 2021-09-27

## 2021-09-27 RX ORDER — PAROXETINE HYDROCHLORIDE 40 MG/1
TABLET, FILM COATED ORAL
Qty: 90 TABLET | Refills: 3 | Status: SHIPPED
Start: 2021-09-27 | End: 2022-08-09

## 2021-09-27 RX ORDER — ESOMEPRAZOLE MAGNESIUM 40 MG/1
CAPSULE, DELAYED RELEASE ORAL
Qty: 90 CAPSULE | Refills: 3 | Status: SHIPPED
Start: 2021-09-27 | End: 2022-02-08

## 2021-09-27 NOTE — TELEPHONE ENCOUNTER
Last Appointment:  8/6/2021  Future Appointments   Date Time Provider Zara Cordoba   2/8/2022  1:00 PM Falguni Geiger  [address] [place] [address]

## 2022-02-08 ENCOUNTER — OFFICE VISIT (OUTPATIENT)
Dept: FAMILY MEDICINE CLINIC | Age: 67
End: 2022-02-08
Payer: MEDICARE

## 2022-02-08 VITALS
HEIGHT: 63 IN | DIASTOLIC BLOOD PRESSURE: 70 MMHG | RESPIRATION RATE: 16 BRPM | SYSTOLIC BLOOD PRESSURE: 120 MMHG | OXYGEN SATURATION: 96 % | TEMPERATURE: 98 F | WEIGHT: 199.8 LBS | HEART RATE: 86 BPM | BODY MASS INDEX: 35.4 KG/M2

## 2022-02-08 DIAGNOSIS — M15.9 PRIMARY OSTEOARTHRITIS INVOLVING MULTIPLE JOINTS: ICD-10-CM

## 2022-02-08 DIAGNOSIS — E11.9 TYPE 2 DIABETES MELLITUS WITHOUT COMPLICATION, WITHOUT LONG-TERM CURRENT USE OF INSULIN (HCC): ICD-10-CM

## 2022-02-08 DIAGNOSIS — E03.9 ACQUIRED HYPOTHYROIDISM: ICD-10-CM

## 2022-02-08 DIAGNOSIS — Z12.31 BREAST CANCER SCREENING BY MAMMOGRAM: ICD-10-CM

## 2022-02-08 DIAGNOSIS — Z12.31 BREAST CANCER SCREENING BY MAMMOGRAM: Primary | ICD-10-CM

## 2022-02-08 DIAGNOSIS — K21.9 GASTROESOPHAGEAL REFLUX DISEASE WITHOUT ESOPHAGITIS: ICD-10-CM

## 2022-02-08 LAB
ALBUMIN SERPL-MCNC: 4.5 G/DL (ref 3.5–5.2)
ALP BLD-CCNC: 191 U/L (ref 35–104)
ALT SERPL-CCNC: 26 U/L (ref 0–32)
ANION GAP SERPL CALCULATED.3IONS-SCNC: 19 MMOL/L (ref 7–16)
AST SERPL-CCNC: 20 U/L (ref 0–31)
BILIRUB SERPL-MCNC: 0.3 MG/DL (ref 0–1.2)
BUN BLDV-MCNC: 15 MG/DL (ref 6–23)
CALCIUM SERPL-MCNC: 10.1 MG/DL (ref 8.6–10.2)
CHLORIDE BLD-SCNC: 106 MMOL/L (ref 98–107)
CHOLESTEROL, TOTAL: 187 MG/DL (ref 0–199)
CO2: 20 MMOL/L (ref 22–29)
CREAT SERPL-MCNC: 0.9 MG/DL (ref 0.5–1)
GFR AFRICAN AMERICAN: >60
GFR NON-AFRICAN AMERICAN: >60 ML/MIN/1.73
GLUCOSE BLD-MCNC: 153 MG/DL (ref 74–99)
HBA1C MFR BLD: 7.2 % (ref 4–5.6)
HDLC SERPL-MCNC: 47 MG/DL
LDL CHOLESTEROL CALCULATED: 94 MG/DL (ref 0–99)
POTASSIUM SERPL-SCNC: 4.7 MMOL/L (ref 3.5–5)
SODIUM BLD-SCNC: 145 MMOL/L (ref 132–146)
TOTAL PROTEIN: 7.8 G/DL (ref 6.4–8.3)
TRIGL SERPL-MCNC: 231 MG/DL (ref 0–149)
TSH SERPL DL<=0.05 MIU/L-ACNC: <0.01 UIU/ML (ref 0.27–4.2)
VLDLC SERPL CALC-MCNC: 46 MG/DL

## 2022-02-08 PROCEDURE — G8400 PT W/DXA NO RESULTS DOC: HCPCS | Performed by: INTERNAL MEDICINE

## 2022-02-08 PROCEDURE — 1090F PRES/ABSN URINE INCON ASSESS: CPT | Performed by: INTERNAL MEDICINE

## 2022-02-08 PROCEDURE — 1123F ACP DISCUSS/DSCN MKR DOCD: CPT | Performed by: INTERNAL MEDICINE

## 2022-02-08 PROCEDURE — G8427 DOCREV CUR MEDS BY ELIG CLIN: HCPCS | Performed by: INTERNAL MEDICINE

## 2022-02-08 PROCEDURE — 2022F DILAT RTA XM EVC RTNOPTHY: CPT | Performed by: INTERNAL MEDICINE

## 2022-02-08 PROCEDURE — 4040F PNEUMOC VAC/ADMIN/RCVD: CPT | Performed by: INTERNAL MEDICINE

## 2022-02-08 PROCEDURE — 3046F HEMOGLOBIN A1C LEVEL >9.0%: CPT | Performed by: INTERNAL MEDICINE

## 2022-02-08 PROCEDURE — 3017F COLORECTAL CA SCREEN DOC REV: CPT | Performed by: INTERNAL MEDICINE

## 2022-02-08 PROCEDURE — 1036F TOBACCO NON-USER: CPT | Performed by: INTERNAL MEDICINE

## 2022-02-08 PROCEDURE — G8484 FLU IMMUNIZE NO ADMIN: HCPCS | Performed by: INTERNAL MEDICINE

## 2022-02-08 PROCEDURE — G8417 CALC BMI ABV UP PARAM F/U: HCPCS | Performed by: INTERNAL MEDICINE

## 2022-02-08 PROCEDURE — 99214 OFFICE O/P EST MOD 30 MIN: CPT | Performed by: INTERNAL MEDICINE

## 2022-02-08 RX ORDER — PAROXETINE 30 MG/1
30 TABLET, FILM COATED ORAL DAILY
Qty: 90 TABLET | Refills: 2 | Status: SHIPPED
Start: 2022-02-08 | End: 2022-08-09 | Stop reason: ALTCHOICE

## 2022-02-08 SDOH — ECONOMIC STABILITY: FOOD INSECURITY: WITHIN THE PAST 12 MONTHS, YOU WORRIED THAT YOUR FOOD WOULD RUN OUT BEFORE YOU GOT MONEY TO BUY MORE.: PATIENT DECLINED

## 2022-02-08 SDOH — ECONOMIC STABILITY: FOOD INSECURITY: WITHIN THE PAST 12 MONTHS, THE FOOD YOU BOUGHT JUST DIDN'T LAST AND YOU DIDN'T HAVE MONEY TO GET MORE.: PATIENT DECLINED

## 2022-02-08 ASSESSMENT — PATIENT HEALTH QUESTIONNAIRE - PHQ9
9. THOUGHTS THAT YOU WOULD BE BETTER OFF DEAD, OR OF HURTING YOURSELF: 0
SUM OF ALL RESPONSES TO PHQ QUESTIONS 1-9: 4
7. TROUBLE CONCENTRATING ON THINGS, SUCH AS READING THE NEWSPAPER OR WATCHING TELEVISION: 0
SUM OF ALL RESPONSES TO PHQ QUESTIONS 1-9: 4
SUM OF ALL RESPONSES TO PHQ QUESTIONS 1-9: 4
10. IF YOU CHECKED OFF ANY PROBLEMS, HOW DIFFICULT HAVE THESE PROBLEMS MADE IT FOR YOU TO DO YOUR WORK, TAKE CARE OF THINGS AT HOME, OR GET ALONG WITH OTHER PEOPLE: 0
3. TROUBLE FALLING OR STAYING ASLEEP: 3
5. POOR APPETITE OR OVEREATING: 1
4. FEELING TIRED OR HAVING LITTLE ENERGY: 0
8. MOVING OR SPEAKING SO SLOWLY THAT OTHER PEOPLE COULD HAVE NOTICED. OR THE OPPOSITE, BEING SO FIGETY OR RESTLESS THAT YOU HAVE BEEN MOVING AROUND A LOT MORE THAN USUAL: 0
SUM OF ALL RESPONSES TO PHQ QUESTIONS 1-9: 4
6. FEELING BAD ABOUT YOURSELF - OR THAT YOU ARE A FAILURE OR HAVE LET YOURSELF OR YOUR FAMILY DOWN: 0
SUM OF ALL RESPONSES TO PHQ9 QUESTIONS 1 & 2: 0
2. FEELING DOWN, DEPRESSED OR HOPELESS: 0
1. LITTLE INTEREST OR PLEASURE IN DOING THINGS: 0

## 2022-02-08 ASSESSMENT — SOCIAL DETERMINANTS OF HEALTH (SDOH): HOW HARD IS IT FOR YOU TO PAY FOR THE VERY BASICS LIKE FOOD, HOUSING, MEDICAL CARE, AND HEATING?: PATIENT DECLINED

## 2022-02-08 NOTE — PROGRESS NOTES
She has had resolution of her neck and shoulder discomfort. She said it took quite a bit of time to get this better. Patient has not had her hemoglobin A1c taken in a while. Her labs from October are reviewed. Patient did have 2 Covid vaccinations. We did discuss her high-dose Paxil use. At this point time her situation is better and we will decrease this to 30 mg. If she would like to change this to 20 I told her that she could call and we would do this. She will get a mammogram today. She has not yet gotten colonoscopy and we discussed this once again today. She is still having diarrhea occasionally is probably secondary to her Metformin use. She states that this is acceptable and is really not burdensome. She does have a televisit with her endocrinologist on Friday and I will get labs today and this will be sent to the endocrinologist.  Narcisa Sport:  Const: General health stated as good. Eyes: Blepharospasm, improved  ENMT: . Arnodl Carpenter Reports allergies. CV: Reports chest pain, pain on exertion; hyperlipidemia and palpitations. Resp: Denies respiratory symptoms. GI: GI symptoms have improved. : Menopause 2008 Urinary: reports kidney disease. Musculo: Reports arthritis. Skin: Denies skin, hair and nail symptoms. Neuro: Denies neurologic symptoms. Psych: Improved mood with Paxil use and moved to [facility] [place]  Endocrine: Reports diabetes and hypothyroidism but denies polydipsia, polyphagia or polyuria. Hema/Lymph: Denies hematologic symptoms. Denies lymphatic symptoms.     Current Outpatient Medications:     metoprolol tartrate (LOPRESSOR) 50 MG tablet, TAKE 1 TABLET BY MOUTH  DAILY, Disp: 90 tablet, Rfl: 3    PARoxetine (PAXIL) 40 MG tablet, TAKE 1 TABLET BY MOUTH IN  THE MORNING, Disp: 90 tablet, Rfl: 3    esomeprazole (NEXIUM) 40 MG delayed release capsule, TAKE 1 CAPSULE BY MOUTH AT  NIGHT, Disp: 90 capsule, Rfl: 3    Insulin Glargine (TOUJEO SOLOSTAR SC), Inject into the skin 30 units daily, Disp: , Rfl:     atorvastatin (LIPITOR) 40 MG tablet, TAKE ONE TABLET BY MOUTH EVERY DAY, Disp: 90 tablet, Rfl: 1    levothyroxine (SYNTHROID) 50 MCG tablet, Take 1 tablet by mouth Daily (Patient taking differently: Take 50 mcg by mouth Daily One daily, half on Sunday), Disp: 30 tablet, Rfl: 0    vitamin D (ERGOCALCIFEROL) 1.25 MG (71871 UT) CAPS capsule, Take 50,000 Units by mouth once a week wednesdays, Disp: , Rfl:     metFORMIN (GLUCOPHAGE-XR) 500 MG extended release tablet, TAKE TWO TABLETS BY MOUTH TWO TIMES A DAY, Disp: , Rfl: 3    glimepiride (AMARYL) 4 MG tablet, Take 4 mg by mouth 2 times daily (before meals), Disp: , Rfl:     Empagliflozin-Linagliptin (GLYXAMBI) 25-5 MG TABS, Take 1 tablet by mouth daily, Disp: , Rfl:     pioglitazone (ACTOS) 15 MG tablet, Take 15 mg by mouth nightly , Disp: , Rfl:   Allergies: Penicillin - rash  Sulfa - rash  Zyrtec - rash  z-suzanne - rash  PMH:  Health Maintenance:  Mammogram Screening - 2/8/2022  Bone Density Test Screening - (4/27/2011)  EGD - 2002 GREEN  2005 LUIS  Colonoscopy - (2006) wnl  Mammogram - (2014)  Pneumonia Vaccination - 2005  Influenza Vaccination - (2016)  Tdap - (2/16/2011)  Eye Exams - YEARLY JAYMIE  Hepatitis B Series - YEARS AGO  Pelvic/Pap Exam - (4/12/2012) pap & hpv neg  Bone Density Test - (4/27/2011) normal but low normal bone density  Stress Test - (3/2016) LEXISCAN-NORMAL  COVID vaccine 7/13/2021  Medical Problems:  Type 2 Diabetes - DIET CONTROLLED 1995  BEGAN MEDS 2005 COROLLO  Hypothyroidism - 1987  Palpitations - FULL EVALUATION KALYAN 2000- BEGAN METOPROLOL  STRESS TEST 2005- ABNORMAL  HEART CATH 2005 NORMAL [facility]  GERD - EGD 2002 GREEN  EGD 2005 LUIS- REFERRED BACK 2/11  Hyperlipidemia - 2005 BEGAN STATINS  Seasonal Allergies - (2/16/2011) BEGAN FLONASE  Recurrent Sinusitis - NASAL SWAB TAKEN, MAY NEED IMMUNGLOBULIN STUDIES  Anxiety - (6/26/2013) BEGAN PAXIL  Depression, Reflux Disease  Osteoarthritis - (2017) KNEES- REFERRED TO BRIGIDO  Insomnia - (6/26/2013) BEGAN PAXIL-GOOD RESPONSE 7/31/13  Right Breast Swelling And Pain - NEGATIVE DIAGNOSTIC MAMMOGRAM/DR ARORA  Nausea/Vomiting/Diarrhea - PROBABLE SE FROM BYDUREON  Chest Pain - (2/17/2016) EKG/STRESS TEST/CHANGE TO METOPROLOL SUCCINATE  Kidney Disease - (2/1/2017) QUESTIONABLE CONTROL  Post-Op Anemia - (7/2017)  Tinnitus - REFERRED TO KATHERYN  Hearing Loss - (3/14/2018) KATHERYN  Surgical Hx:  Tubal Ligation  Endometrieosis - (1986) laparoscopic  Cholecystectomy - (1/2012)  Knees-bilateral replacement  Reviewed, no changes. FH:  Father:  Liver Cancer. Mother:  Esophagus Cancer, Non Insulin Dependent Diabetes, Thyroid Disease. Sister 1:  Insulin Dependent Diabetes. Reviewed, no changes. SH:  Marital: Legal Status: . Personal Habits: Cigarette Use: Former Cigarette Smoker 2 Packs Daily, Pack Years - 27, Quit -  17. Alcohol: Never used alcohol. Daily Caffeine: Consumes on average 1 soda per day. Exercise  Type: Walks 3 times a week. Reviewed, no changes. Date:2/8/2022  Was the patient queried about smoking behavior? Yes   Does the patient currently smoke? Smoking: Patient is a former smoker. Vitals:    02/08/22 1257   BP: 120/70   Pulse: 86   Resp: 16   Temp: 98 °F (36.7 °C)   SpO2: 96%     Exam:  Const: Appears healthy, well developed and well nourished. Appears obese. Eyes: EOMI in both eyes. PERRL. ENMT: External ears WNL. TM's intact and middle ear well aerated. External nose WNL. Neck: Supple and symmetric. Palpation reveals no adenopathy. No masses appreciated. Thyroid  exhibits no nodule or thyromegaly. No JVD. Resp: Respirations are unlabored. Respiration rate is normal. Auscultate mildly decreased airflow. No  rales, rhonchi or wheezes appreciated over the lungs bilaterally. CV: Rhythm is regular. S1 is normal. S2 is normal. Carotids: no bruits. Abdominal aorta: not  palpable. Pedal pulses: 2+ and equal bilaterally.  Extremities: No clubbing, cyanosis or edema. Abdomen: Positive bowel sounds soft. No CVA tenderness bilaterally. No rebound or guarding. No distinct mass. Musculo: Walks with a normal gait. Upper Extremities: Physiologic range of motion bilaterally. Swelling of the deltoid muscle extending down into the mid humeral area on the left side. No fluctuance or erythema. Lower  Extremities: DJD changes of the lower extremities. Skin: Skin is warm and dry without rash. Neuro: Alert and oriented x3. Mood is normal. Affect is normal. Speech is articulate and fluent. Reflexes: DTR's are intact bilaterally. Psych: Patient's attitude is cooperative. Patient's affect is appropriate. Judgement is realistic. Insight  is appropriate. [place] Sarkar was seen today for other. Diagnoses and all orders for this visit:    Breast cancer screening by mammogram  -     ISABELA DIGITAL SCREEN W OR WO CAD BILATERAL; Future  -     Lipid Panel; Future  -     TSH without Reflex; Future  -     Comprehensive Metabolic Panel; Future  -     HEMOGLOBIN A1C; Future    Gastroesophageal reflux disease without esophagitis  -     Lipid Panel; Future  -     TSH without Reflex; Future  -     Comprehensive Metabolic Panel; Future  -     HEMOGLOBIN A1C; Future    Acquired hypothyroidism  -     Lipid Panel; Future  -     TSH without Reflex; Future  -     Comprehensive Metabolic Panel; Future  -     HEMOGLOBIN A1C; Future    Type 2 diabetes mellitus without complication, without long-term current use of insulin (HCC)  -     Lipid Panel; Future  -     TSH without Reflex; Future  -     Comprehensive Metabolic Panel; Future  -     HEMOGLOBIN A1C; Future    Primary osteoarthritis involving multiple joints  -     Lipid Panel; Future  -     TSH without Reflex; Future  -     Comprehensive Metabolic Panel; Future  -     HEMOGLOBIN A1C; Future    Other orders  -     PARoxetine (PAXIL) 30 MG tablet; Take 1 tablet by mouth daily    Lab work and mammogram today.   She states that she will set up a colonoscopy with Dr. Sung Rivera. I will see the patient back in 6 months.   Lab work will need to be faxed to her endocrinologist.

## 2022-02-09 DIAGNOSIS — R74.8 ELEVATED ALKALINE PHOSPHATASE LEVEL: Primary | ICD-10-CM

## 2022-02-09 LAB — GAMMA GLUTAMYL TRANSFERASE: 21 U/L (ref 6–42)

## 2022-08-09 ENCOUNTER — OFFICE VISIT (OUTPATIENT)
Dept: FAMILY MEDICINE CLINIC | Age: 67
End: 2022-08-09
Payer: MEDICARE

## 2022-08-09 VITALS
WEIGHT: 204 LBS | TEMPERATURE: 98.8 F | SYSTOLIC BLOOD PRESSURE: 120 MMHG | DIASTOLIC BLOOD PRESSURE: 80 MMHG | BODY MASS INDEX: 36.14 KG/M2 | HEART RATE: 75 BPM | OXYGEN SATURATION: 94 %

## 2022-08-09 VITALS
WEIGHT: 204 LBS | HEART RATE: 75 BPM | SYSTOLIC BLOOD PRESSURE: 120 MMHG | BODY MASS INDEX: 36.14 KG/M2 | DIASTOLIC BLOOD PRESSURE: 80 MMHG | OXYGEN SATURATION: 94 % | TEMPERATURE: 98.8 F

## 2022-08-09 DIAGNOSIS — E11.9 TYPE 2 DIABETES MELLITUS WITHOUT COMPLICATION, WITHOUT LONG-TERM CURRENT USE OF INSULIN (HCC): ICD-10-CM

## 2022-08-09 DIAGNOSIS — F32.9 REACTIVE DEPRESSION: ICD-10-CM

## 2022-08-09 DIAGNOSIS — F41.9 ANXIETY: ICD-10-CM

## 2022-08-09 DIAGNOSIS — K21.9 GASTROESOPHAGEAL REFLUX DISEASE WITHOUT ESOPHAGITIS: ICD-10-CM

## 2022-08-09 DIAGNOSIS — Z00.00 INITIAL MEDICARE ANNUAL WELLNESS VISIT: Primary | ICD-10-CM

## 2022-08-09 DIAGNOSIS — M15.9 PRIMARY OSTEOARTHRITIS INVOLVING MULTIPLE JOINTS: ICD-10-CM

## 2022-08-09 DIAGNOSIS — E03.9 ACQUIRED HYPOTHYROIDISM: Primary | ICD-10-CM

## 2022-08-09 DIAGNOSIS — Z00.00 ENCOUNTER FOR SUBSEQUENT ANNUAL WELLNESS VISIT (AWV) IN MEDICARE PATIENT: ICD-10-CM

## 2022-08-09 DIAGNOSIS — R53.83 FATIGUE, UNSPECIFIED TYPE: ICD-10-CM

## 2022-08-09 PROCEDURE — 99214 OFFICE O/P EST MOD 30 MIN: CPT | Performed by: INTERNAL MEDICINE

## 2022-08-09 PROCEDURE — 1123F ACP DISCUSS/DSCN MKR DOCD: CPT | Performed by: INTERNAL MEDICINE

## 2022-08-09 PROCEDURE — G0438 PPPS, INITIAL VISIT: HCPCS | Performed by: INTERNAL MEDICINE

## 2022-08-09 PROCEDURE — 3051F HG A1C>EQUAL 7.0%<8.0%: CPT | Performed by: INTERNAL MEDICINE

## 2022-08-09 RX ORDER — PAROXETINE HYDROCHLORIDE 20 MG/1
20 TABLET, FILM COATED ORAL DAILY
Qty: 90 TABLET | Refills: 2 | Status: SHIPPED | OUTPATIENT
Start: 2022-08-09

## 2022-08-09 RX ORDER — INSULIN DEGLUDEC INJECTION 100 U/ML
INJECTION, SOLUTION SUBCUTANEOUS
COMMUNITY
Start: 2022-08-08

## 2022-08-09 RX ORDER — ESOMEPRAZOLE MAGNESIUM 40 MG/1
CAPSULE, DELAYED RELEASE ORAL
COMMUNITY
Start: 2022-07-08

## 2022-08-09 RX ORDER — ATORVASTATIN CALCIUM 40 MG/1
TABLET, FILM COATED ORAL
Qty: 90 TABLET | Refills: 1 | Status: SHIPPED | OUTPATIENT
Start: 2022-08-09

## 2022-08-09 RX ORDER — LEVOTHYROXINE SODIUM 112 UG/1
TABLET ORAL
COMMUNITY
Start: 2022-08-03

## 2022-08-09 ASSESSMENT — PATIENT HEALTH QUESTIONNAIRE - PHQ9
6. FEELING BAD ABOUT YOURSELF - OR THAT YOU ARE A FAILURE OR HAVE LET YOURSELF OR YOUR FAMILY DOWN: 0
10. IF YOU CHECKED OFF ANY PROBLEMS, HOW DIFFICULT HAVE THESE PROBLEMS MADE IT FOR YOU TO DO YOUR WORK, TAKE CARE OF THINGS AT HOME, OR GET ALONG WITH OTHER PEOPLE: 0
4. FEELING TIRED OR HAVING LITTLE ENERGY: 0
7. TROUBLE CONCENTRATING ON THINGS, SUCH AS READING THE NEWSPAPER OR WATCHING TELEVISION: 0
5. POOR APPETITE OR OVEREATING: 0
1. LITTLE INTEREST OR PLEASURE IN DOING THINGS: 0
8. MOVING OR SPEAKING SO SLOWLY THAT OTHER PEOPLE COULD HAVE NOTICED. OR THE OPPOSITE, BEING SO FIGETY OR RESTLESS THAT YOU HAVE BEEN MOVING AROUND A LOT MORE THAN USUAL: 0
SUM OF ALL RESPONSES TO PHQ QUESTIONS 1-9: 3
SUM OF ALL RESPONSES TO PHQ QUESTIONS 1-9: 3
9. THOUGHTS THAT YOU WOULD BE BETTER OFF DEAD, OR OF HURTING YOURSELF: 0
3. TROUBLE FALLING OR STAYING ASLEEP: 3
SUM OF ALL RESPONSES TO PHQ QUESTIONS 1-9: 3
SUM OF ALL RESPONSES TO PHQ9 QUESTIONS 1 & 2: 0
2. FEELING DOWN, DEPRESSED OR HOPELESS: 0
SUM OF ALL RESPONSES TO PHQ QUESTIONS 1-9: 3

## 2022-08-09 ASSESSMENT — LIFESTYLE VARIABLES
HOW OFTEN DO YOU HAVE A DRINK CONTAINING ALCOHOL: MONTHLY OR LESS
HOW MANY STANDARD DRINKS CONTAINING ALCOHOL DO YOU HAVE ON A TYPICAL DAY: 1 OR 2

## 2022-08-09 NOTE — PROGRESS NOTES
She has had some slight worsening of chronic back pain. She is not having radicular symptoms. She is a bit fatigued. Her thyroid dose was adjusted by endocrinology in June. She does have upcoming labs with endocrinology in early October. She denies any black tarry stools or blood in her stools. Patient has not gotten a dilated eye exam at this point I have urged her to do so. Patient denies cardiac or respiratory symptomatology. She is really not having hip discomfort. He believes her blood sugar numbers will improve. ROS:  Const: General health stated as good. Eyes: Resolved blepharospasm. ENMT: . Cheng Wen Reports allergies. CV: Reports chest pain, pain on exertion; hyperlipidemia and palpitations. Resp: Denies respiratory symptoms. GI: GI symptoms have improved. : Menopause 2008 Urinary: reports kidney disease. Musculo: Reports arthritis. Skin: Denies skin, hair and nail symptoms. Neuro: Denies neurologic symptoms. Psych: Currently trying to slowly wean Paxil. Endocrine: Reports diabetes and hypothyroidism but denies polydipsia, polyphagia or polyuria. Hema/Lymph: Denies hematologic symptoms. Denies lymphatic symptoms.     Current Outpatient Medications:     PARoxetine (PAXIL) 30 MG tablet, Take 1 tablet by mouth daily, Disp: 90 tablet, Rfl: 2    metoprolol tartrate (LOPRESSOR) 50 MG tablet, TAKE 1 TABLET BY MOUTH  DAILY, Disp: 90 tablet, Rfl: 3    PARoxetine (PAXIL) 40 MG tablet, TAKE 1 TABLET BY MOUTH IN  THE MORNING, Disp: 90 tablet, Rfl: 3    Insulin Glargine (TOUJEO SOLOSTAR SC), Inject into the skin 30 units daily, Disp: , Rfl:     atorvastatin (LIPITOR) 40 MG tablet, TAKE ONE TABLET BY MOUTH EVERY DAY, Disp: 90 tablet, Rfl: 1    levothyroxine (SYNTHROID) 50 MCG tablet, Take 1 tablet by mouth Daily (Patient taking differently: Take 50 mcg by mouth Daily One daily, half on Sunday), Disp: 30 tablet, Rfl: 0    vitamin D (ERGOCALCIFEROL) 1.25 MG (38253 UT) CAPS capsule, Take 50,000 Units by mouth once a week wednesdays, Disp: , Rfl:     metFORMIN (GLUCOPHAGE-XR) 500 MG extended release tablet, TAKE TWO TABLETS BY MOUTH TWO TIMES A DAY, Disp: , Rfl: 3    glimepiride (AMARYL) 4 MG tablet, Take 4 mg by mouth 2 times daily (before meals), Disp: , Rfl:     Empagliflozin-Linagliptin (GLYXAMBI) 25-5 MG TABS, Take 1 tablet by mouth daily, Disp: , Rfl:     pioglitazone (ACTOS) 15 MG tablet, Take 15 mg by mouth nightly , Disp: , Rfl:   Allergies: Penicillin - rash  Sulfa - rash  Zyrtec - rash  z-suzanne - rash  PMH:  Health Maintenance:  Mammogram Screening - 2/8/2022  Bone Density Test Screening - (4/27/2011)  EGD - 2002 GREEN  2005 LUIS  Colonoscopy - (2006) wnl  Mammogram - (2014)  Pneumonia Vaccination - 2005  Influenza Vaccination - (2016)  Tdap - (2/16/2011)  Eye Exams - YEARLY JAYMIE  Hepatitis B Series - YEARS AGO  Pelvic/Pap Exam - (4/12/2012) pap & hpv neg  Bone Density Test - (4/27/2011) normal but low normal bone density  Stress Test - (3/2016) LEXISCAN-NORMAL  COVID vaccine 7/13/2021  Medical Problems:  Type 2 Diabetes - DIET CONTROLLED 1995  BEGAN MEDS 2005 COROLLO  Hypothyroidism - 1987  Palpitations - FULL EVALUATION RAHIJA 2000- BEGAN METOPROLOL  STRESS TEST 2005- ABNORMAL  HEART CATH 2005 NORMAL [facility]  GERD - EGD 2002 GREEN  EGD 2005 LUIS- REFERRED BACK 2/11  Hyperlipidemia - 2005 BEGAN STATINS  Seasonal Allergies - (2/16/2011) BEGAN FLONASE  Recurrent Sinusitis - NASAL SWAB TAKEN, MAY NEED IMMUNGLOBULIN STUDIES  Anxiety - (6/26/2013) BEGAN PAXIL  Depression, Reflux Disease  Osteoarthritis - (2017) KNEES- REFERRED TO BRIGIDO  Insomnia - (6/26/2013) BEGAN PAXIL-GOOD RESPONSE 7/31/13  Right Breast Swelling And Pain - NEGATIVE DIAGNOSTIC MAMMOGRAM/DR ARORA  Nausea/Vomiting/Diarrhea - PROBABLE SE FROM BYDUREON  Chest Pain - (2/17/2016) EKG/STRESS TEST/CHANGE TO METOPROLOL SUCCINATE  Kidney Disease - (2/1/2017) QUESTIONABLE CONTROL  Post-Op Anemia - (7/2017)  Tinnitus - REFERRED TO KATHERYN  Hearing Loss - (3/14/2018) KATHERYN  Surgical Hx:  Tubal Ligation  Endometrieosis - (5770) laparoscopic  Cholecystectomy - (1/2012)  Knees-bilateral replacement  Reviewed, no changes. FH:  Father:  Liver Cancer. Mother:  Esophagus Cancer, Non Insulin Dependent Diabetes, Thyroid Disease. Sister 1:  Insulin Dependent Diabetes. Reviewed, no changes. SH:  Marital: Legal Status: . Personal Habits: Cigarette Use: Former Cigarette Smoker 2 Packs Daily, Pack Years - 27, Quit -  18. Alcohol: Never used alcohol. Daily Caffeine: Consumes on average 1 soda per day. Exercise  Type: Walks 3 times a week. Reviewed, no changes. Date:2/8/2022  Was the patient queried about smoking behavior? Yes   Does the patient currently smoke? Smoking: Patient is a former smoker. Vitals:    08/09/22 1303   BP: 120/80   Pulse: 75   Temp: 98.8 °F (37.1 °C)   SpO2: 94%        Exam:  Const: Appears healthy, well developed and well nourished. Appears obese. Eyes: EOMI in both eyes. PERRL. ENMT: External ears WNL. TM's intact and middle ear well aerated. External nose WNL. Neck: Supple and symmetric. Palpation reveals no adenopathy. No masses appreciated. Thyroid  exhibits no nodule or thyromegaly. No JVD. Resp: Respirations are unlabored. Respiration rate is normal. Auscultate mildly decreased airflow. No  rales, rhonchi or wheezes appreciated over the lungs bilaterally. CV: Rhythm is regular. S1 is normal. S2 is normal. Carotids: no bruits. Abdominal aorta: not  palpable. Pedal pulses: 2+ and equal bilaterally. Extremities: No clubbing, cyanosis or edema. Abdomen: Positive bowel sounds soft. No CVA tenderness bilaterally. No rebound or guarding. No distinct mass. Musculo: Walks with a normal gait. Upper Extremities: Physiologic range of motion bilaterally. Lower  Extremities: DJD changes of the lower extremities. Range of motion of both hips to flexion extension in eversion and eversion.   No pain over the greater trochanter bilaterally. Skin: Skin is warm and dry without rash. Neuro: Alert and oriented x3. Mood is normal. Affect is normal. Speech is articulate and fluent. Reflexes: DTR's are intact bilaterally. Intact vibratory sense of the lower extremities. Psych: Patient's attitude is cooperative. Patient's affect is appropriate. Judgement is realistic. Insight  is appropriate. Alireza Allan was seen today for 6 month follow-up. Diagnoses and all orders for this visit:    Acquired hypothyroidism    Type 2 diabetes mellitus without complication, without long-term current use of insulin ([facility])    Gastroesophageal reflux disease without esophagitis    Primary osteoarthritis involving multiple joints    Anxiety    Reactive depression    Fatigue, unspecified type  -     CBC with Auto Differential; Future    Other orders  -     atorvastatin (LIPITOR) 40 MG tablet; TAKE ONE TABLET BY MOUTH EVERY DAY  -     PARoxetine (PAXIL) 20 MG tablet; Take 1 tablet by mouth in the morning. I did not see anything obvious in terms of funduscopic examination but I did urge her to get a dilated eye examination. Her vibratory sense was intact of the lower extremity.

## 2022-08-09 NOTE — PROGRESS NOTES
Medicare Annual Wellness Visit    Cynthia Fisher is here for Medicare AWV    Assessment & Plan   Encounter for subsequent annual wellness visit (AWV) in Medicare patient    Recommendations for Preventive Services Due: see orders and patient instructions/AVS.  Recommended screening schedule for the next 5-10 years is provided to the patient in written form: see Patient Instructions/AVS.     No follow-ups on file. Subjective       Patient's complete Health Risk Assessment and screening values have been reviewed and are found in Flowsheets. The following problems were reviewed today and where indicated follow up appointments were made and/or referrals ordered.     Positive Risk Factor Screenings with Interventions:             General Health and ACP:  General  In general, how would you say your health is?: Good  In the past 7 days, have you experienced any of the following: New or Increased Pain, New or Increased Fatigue, Loneliness, Social Isolation, Stress or Anger?: No  Do you get the social and emotional support that you need?: Yes  Do you have a Living Will?: (!) No    Advance Directives       Power of  Living Will ACP-Advance Directive ACP-Power of     Not on File Not on File Not on File Not on File        General Health Risk Interventions:  No Living Will: Advance Care Planning addressed with patient today    Health Habits/Nutrition:  Physical Activity: Insufficiently Active    Days of Exercise per Week: 1 day    Minutes of Exercise per Session: 20 min     Have you lost any weight without trying in the past 3 months?: No     Have you seen the dentist within the past year?: (!) No  Health Habits/Nutrition Interventions:  Inadequate physical activity:  patient agrees to exercise for at least 150 minutes/week    Hearing/Vision:  Do you or your family notice any trouble with your hearing that hasn't been managed with hearing aids?: (!) Yes  Do you have difficulty driving, watching TV, or doing any of your daily activities because of your eyesight?: No  Have you had an eye exam within the past year?: (!) No  No results found. Hearing/Vision Interventions:  Hearing concerns:  patient declines any further evaluation/treatment for hearing issues            Objective   Vitals:    08/09/22 1316   BP: 120/80   Pulse: 75   Temp: 98.8 °F (37.1 °C)   SpO2: 94%   Weight: 204 lb (92.5 kg)      Body mass index is 36.14 kg/m². Allergies   Allergen Reactions    Azithromycin     Pcn [Penicillins] Rash    Sulfa Antibiotics Rash    Zyrtec [Cetirizine] Rash     Prior to Visit Medications    Medication Sig Taking?  Authorizing Provider   esomeprazole (NEXIUM) 40 MG delayed release capsule   Historical Provider, MD   Insulin Degludec (TRESIBA) 100 UNIT/ML SOLN   Historical Provider, MD   levothyroxine (SYNTHROID) 112 MCG tablet   Historical Provider, MD   metoprolol tartrate (LOPRESSOR) 50 MG tablet TAKE 1 TABLET BY MOUTH  DAILY  Brook Bear MD   atorvastatin (LIPITOR) 40 MG tablet TAKE ONE TABLET BY MOUTH EVERY DAY  Brook Bear MD   vitamin D (ERGOCALCIFEROL) 1.25 MG (82384 UT) CAPS capsule Take 50,000 Units by mouth once a week wednesdays  Historical Provider, MD   metFORMIN (GLUCOPHAGE-XR) 500 MG extended release tablet TAKE TWO TABLETS BY MOUTH TWO TIMES A DAY  Historical Provider, MD   glimepiride (AMARYL) 4 MG tablet Take 4 mg by mouth 2 times daily (before meals)  Historical Provider, MD   Empagliflozin-linaGLIPtin 25-5 MG TABS Take 1 tablet by mouth daily  Historical Provider, MD   pioglitazone (ACTOS) 15 MG tablet Take 15 mg by mouth nightly   Historical Provider, MD Walsh (Including outside providers/suppliers regularly involved in providing care):   Patient Care Team:  Brook Bear MD as PCP - Jamison Willett MD as PCP - [facility]  Brook Bear MD as Consulting Physician     Reviewed and updated this visit:  Josue Rochelle was seen today for medicare awv.     Diagnoses and all orders for this visit:    Initial Medicare annual wellness visit    Encounter for subsequent annual wellness visit (AWV) in Medicare patient

## 2022-08-09 NOTE — PATIENT INSTRUCTIONS
Personalized Preventive Plan for Juan Santoyo - 8/9/2022  Medicare offers a range of preventive health benefits. Some of the tests and screenings are paid in full while other may be subject to a deductible, co-insurance, and/or copay. Some of these benefits include a comprehensive review of your medical history including lifestyle, illnesses that may run in your family, and various assessments and screenings as appropriate. After reviewing your medical record and screening and assessments performed today your provider may have ordered immunizations, labs, imaging, and/or referrals for you. A list of these orders (if applicable) as well as your Preventive Care list are included within your After Visit Summary for your review. Other Preventive Recommendations:    A preventive eye exam performed by an eye specialist is recommended every 1-2 years to screen for glaucoma; cataracts, macular degeneration, and other eye disorders. A preventive dental visit is recommended every 6 months. Try to get at least 150 minutes of exercise per week or 10,000 steps per day on a pedometer . Order or download the FREE \"Exercise & Physical Activity: Your Everyday Guide\" from [employer]. Call 2-322.440.9888 or search [employer] online. You need 8065-8542 mg of calcium and 2779-6306 IU of vitamin D per day. It is possible to meet your calcium requirement with diet alone, but a vitamin D supplement is usually necessary to meet this goal.  When exposed to the sun, use a sunscreen that protects against both UVA and UVB radiation with an SPF of 30 or greater. Reapply every 2 to 3 hours or after sweating, drying off with a towel, or swimming. Always wear a seat belt when traveling in a car. Always wear a helmet when riding a bicycle or motorcycle.

## 2022-09-02 RX ORDER — ESOMEPRAZOLE MAGNESIUM 40 MG/1
CAPSULE, DELAYED RELEASE ORAL
Qty: 90 CAPSULE | Refills: 3 | OUTPATIENT
Start: 2022-09-02

## 2022-09-02 RX ORDER — METOPROLOL TARTRATE 50 MG/1
50 TABLET, FILM COATED ORAL DAILY
Qty: 90 TABLET | Refills: 3 | OUTPATIENT
Start: 2022-09-02

## 2022-11-25 RX ORDER — ESOMEPRAZOLE MAGNESIUM 40 MG/1
CAPSULE, DELAYED RELEASE ORAL
Qty: 90 CAPSULE | Refills: 3 | Status: SHIPPED | OUTPATIENT
Start: 2022-11-25

## 2022-11-25 NOTE — TELEPHONE ENCOUNTER
Last Appointment:  8/9/2022  Future Appointments   Date Time Provider Zara Cordoba   2/8/2023  1:00 PM Shellie Paulino MD [facility]   8/15/2023  1:00 PM Shellie Paulino  [address] [place] [address]

## 2022-12-28 RX ORDER — ATORVASTATIN CALCIUM 40 MG/1
TABLET, FILM COATED ORAL
Qty: 90 TABLET | Refills: 3 | Status: SHIPPED | OUTPATIENT
Start: 2022-12-28

## 2022-12-28 NOTE — TELEPHONE ENCOUNTER
Last Appointment:  8/9/2022  Future Appointments   Date Time Provider Zara Beryl   2/8/2023  1:00 PM Orlin Christian MD [facility]   8/15/2023  1:00 PM Orlin Christian  W [address] [place] [address]

## 2023-02-08 ENCOUNTER — OFFICE VISIT (OUTPATIENT)
Dept: FAMILY MEDICINE CLINIC | Age: 68
End: 2023-02-08
Payer: MEDICARE

## 2023-02-08 VITALS
WEIGHT: 204 LBS | OXYGEN SATURATION: 97 % | TEMPERATURE: 97.5 F | BODY MASS INDEX: 36.14 KG/M2 | HEART RATE: 74 BPM | SYSTOLIC BLOOD PRESSURE: 130 MMHG | DIASTOLIC BLOOD PRESSURE: 70 MMHG

## 2023-02-08 DIAGNOSIS — K21.9 GASTROESOPHAGEAL REFLUX DISEASE WITHOUT ESOPHAGITIS: ICD-10-CM

## 2023-02-08 DIAGNOSIS — E03.9 ACQUIRED HYPOTHYROIDISM: ICD-10-CM

## 2023-02-08 DIAGNOSIS — Z12.11 COLON CANCER SCREENING: ICD-10-CM

## 2023-02-08 DIAGNOSIS — E11.9 TYPE 2 DIABETES MELLITUS WITHOUT COMPLICATION, WITHOUT LONG-TERM CURRENT USE OF INSULIN (HCC): Primary | ICD-10-CM

## 2023-02-08 DIAGNOSIS — E78.2 MIXED HYPERLIPIDEMIA: ICD-10-CM

## 2023-02-08 DIAGNOSIS — M15.9 PRIMARY OSTEOARTHRITIS INVOLVING MULTIPLE JOINTS: ICD-10-CM

## 2023-02-08 PROCEDURE — 99214 OFFICE O/P EST MOD 30 MIN: CPT | Performed by: INTERNAL MEDICINE

## 2023-02-08 PROCEDURE — 1123F ACP DISCUSS/DSCN MKR DOCD: CPT | Performed by: INTERNAL MEDICINE

## 2023-02-08 NOTE — PROGRESS NOTES
Patient seems to be doing fairly well. Blood pressure is well controlled. Urine for microalbumin was negative. She states her blood sugars are coming under better control. Postprandial blood sugars are actually very well controlled. She is unable to tolerate GLP-1 inhibitors. She states the vehicle that their main causes localized hives which is quite uncomfortable. She does not have systemic reactions with this however. We did talk about possible other variations in this treatment. ROS:  Const: General health stated as good. Eyes: Resolved blepharospasm. ENMT: . Madhavi Skaggs Reports allergies. CV: Reports chest pain, pain on exertion; hyperlipidemia and palpitations. Resp: Denies respiratory symptoms. GI: GI symptoms have improved. : Menopause 2008 Urinary: reports kidney disease. Musculo: Reports arthritis. Skin: Denies skin, hair and nail symptoms. Neuro: Denies neurologic symptoms. Psych: Currently trying to slowly wean Paxil. Endocrine: Reports diabetes and hypothyroidism but denies polydipsia, polyphagia or polyuria. Hema/Lymph: Denies hematologic symptoms. Denies lymphatic symptoms.     Current Outpatient Medications:     atorvastatin (LIPITOR) 40 MG tablet, TAKE 1 TABLET BY MOUTH  DAILY, Disp: 90 tablet, Rfl: 3    esomeprazole (NEXIUM) 40 MG delayed release capsule, TAKE 1 CAPSULE BY MOUTH AT  NIGHT, Disp: 90 capsule, Rfl: 3    Insulin Degludec (TRESIBA) 100 UNIT/ML SOLN, , Disp: , Rfl:     levothyroxine (SYNTHROID) 112 MCG tablet, , Disp: , Rfl:     PARoxetine (PAXIL) 20 MG tablet, Take 1 tablet by mouth in the morning., Disp: 90 tablet, Rfl: 2    metoprolol tartrate (LOPRESSOR) 50 MG tablet, TAKE 1 TABLET BY MOUTH  DAILY, Disp: 90 tablet, Rfl: 3    vitamin D (ERGOCALCIFEROL) 1.25 MG (74195 UT) CAPS capsule, Take 50,000 Units by mouth once a week wednesdays, Disp: , Rfl:     metFORMIN (GLUCOPHAGE-XR) 500 MG extended release tablet, TAKE TWO TABLETS BY MOUTH TWO TIMES A DAY, Disp: , Rfl: 3 glimepiride (AMARYL) 4 MG tablet, Take 4 mg by mouth 2 times daily (before meals), Disp: , Rfl:     pioglitazone (ACTOS) 15 MG tablet, Take 15 mg by mouth nightly , Disp: , Rfl:     Empagliflozin-linaGLIPtin 25-5 MG TABS, Take 1 tablet by mouth daily, Disp: , Rfl:   Allergies: Penicillin - rash  Sulfa - rash  Zyrtec - rash  z-suzanne - rash  PMH:  Health Maintenance:  Mammogram Screening - 2/8/2022  Bone Density Test Screening - (4/27/2011)  EGD - 2002 GREEN  2005 LUIS  Colonoscopy - (2006) wnl referred to [address] - (2014)  Pneumonia Vaccination - 2005  Influenza Vaccination - (2016)  Tdap - (2/16/2011)  Eye Exams - YEARLY BRITTNEYI  Hepatitis B Series - YEARS AGO  Pelvic/Pap Exam - (4/12/2012) pap & hpv neg  Bone Density Test - (4/27/2011) normal but low normal bone density  Stress Test - (3/2016) LEXISCAN-NORMAL  COVID vaccine 7/13/2021  Medical Problems:  Type 2 Diabetes - DIET CONTROLLED 1995  BEGAN MEDS 2005 COROLLO  Hypothyroidism - 1987  Palpitations - FULL EVALUATION KALYAN 2000- BEGAN METOPROLOL  STRESS TEST 2005- ABNORMAL  HEART CATH 2005 NORMAL [facility]  GERD - EGD 2002 GREEN  EGD 2005 MARINA- REFERRED BACK 2/11  Hyperlipidemia - 2005 BEGAN STATINS  Seasonal Allergies - (2/16/2011) BEGAN FLONASE  Recurrent Sinusitis - NASAL SWAB TAKEN, MAY NEED IMMUNGLOBULIN STUDIES  Anxiety - (6/26/2013) BEGAN PAXIL  Depression, Reflux Disease  Osteoarthritis - (2017) KNEES- REFERRED TO BRIGIDO  Insomnia - (6/26/2013) BEGAN PAXIL-GOOD RESPONSE 7/31/13  Right Breast Swelling And Pain - NEGATIVE DIAGNOSTIC MAMMOGRAM/DR ARORA  Nausea/Vomiting/Diarrhea - PROBABLE SE FROM BYDUREON  Chest Pain - (2/17/2016) EKG/STRESS TEST/CHANGE TO METOPROLOL SUCCINATE  Kidney Disease - (2/1/2017) QUESTIONABLE CONTROL  Post-Op Anemia - (7/2017)  Tinnitus - REFERRED TO KATHERYN  Hearing Loss - (3/14/2018) KATHERYN  Surgical Hx:  Tubal Ligation  Endometrieosis - (1986) laparoscopic  Cholecystectomy - (1/2012)  Knees-bilateral replacement  Reviewed, no changes. FH:  Father:  Liver Cancer. Mother:  Esophagus Cancer, Non Insulin Dependent Diabetes, Thyroid Disease. Sister 1:  Insulin Dependent Diabetes. Reviewed, no changes. SH:  Marital: Legal Status: . Personal Habits: Cigarette Use: Former Cigarette Smoker 2 Packs Daily, Pack Years - 26, Quit -  17. Alcohol: Never used alcohol. Daily Caffeine: Consumes on average 1 soda per day. Exercise  Type: Walks 3 times a week. Reviewed, no changes. Date2/8/2023  Was the patient queried about smoking behavior? Yes   Does the patient currently smoke? Smoking: Patient is a former smoker. Vitals:    02/08/23 1253   BP: 130/70   Pulse: 74   Temp: 97.5 °F (36.4 °C)   SpO2: 97%        Exam:  Const: Appears healthy, well developed and well nourished. Appears obese. Eyes: EOMI in both eyes. PERRL. ENMT: External ears WNL. TM's intact and middle ear well aerated. External nose WNL. Neck: Supple and symmetric. Palpation reveals no adenopathy. No masses appreciated. Thyroid  exhibits no nodule or thyromegaly. No JVD. Resp: Respirations are unlabored. Respiration rate is normal. Auscultate mildly decreased airflow. No  rales, rhonchi or wheezes appreciated over the lungs bilaterally. CV: Rhythm is regular. S1 is normal. S2 is normal. Carotids: no bruits. Abdominal aorta: not  palpable. Pedal pulses: 2+ and equal bilaterally. Extremities: No clubbing, cyanosis or edema. Abdomen: Positive bowel sounds soft. No CVA tenderness bilaterally. No rebound or guarding. No distinct mass. Musculo: Walks with a normal gait. Upper Extremities: Physiologic range of motion bilaterally. Lower  Extremities: DJD changes of the lower extremities. Range of motion of both hips to flexion extension in eversion and eversion. No pain over the greater trochanter bilaterally. Skin: Skin is warm and dry without rash. Neuro: Alert and oriented x3.  Mood is normal. Affect is normal. Speech is articulate and fluent. Reflexes: DTR's are intact bilaterally. Intact vibratory sense of the lower extremities. Psych: Patient's attitude is cooperative. Patient's affect is appropriate. Judgement is realistic. Insight  is appropriate. Fernando Dennis was seen today for 6 month follow-up. Diagnoses and all orders for this visit:    Type 2 diabetes mellitus without complication, without long-term current use of insulin (HCC)    Acquired hypothyroidism    Primary osteoarthritis involving multiple joints    Gastroesophageal reflux disease without esophagitis    Mixed hyperlipidemia    Colon cancer screening  -     External Referral To Gastroenterology  I will review the labs when they become available. The patient is to be seen back in 6 months. She is referred to a gastroenterologist in [facility]. She is overdue for colonoscopy. We did discussed mammography. At this point time she would like to go every other year.

## 2023-02-24 RX ORDER — METOPROLOL TARTRATE 50 MG/1
50 TABLET, FILM COATED ORAL DAILY
Qty: 90 TABLET | Refills: 3 | Status: SHIPPED | OUTPATIENT
Start: 2023-02-24

## 2023-02-24 RX ORDER — ATORVASTATIN CALCIUM 40 MG/1
TABLET, FILM COATED ORAL
Qty: 90 TABLET | Refills: 3 | Status: SHIPPED | OUTPATIENT
Start: 2023-02-24

## 2023-02-24 NOTE — TELEPHONE ENCOUNTER
Last Appointment:  2/8/2023  Future Appointments   Date Time Provider Zara Cordoba   8/15/2023  1:00 PM Caterina Chaudhary  [address] [place] [address]

## 2023-03-27 RX ORDER — PAROXETINE HYDROCHLORIDE 20 MG/1
20 TABLET, FILM COATED ORAL DAILY
Qty: 90 TABLET | Refills: 3 | Status: SHIPPED | OUTPATIENT
Start: 2023-03-27

## 2023-03-27 NOTE — TELEPHONE ENCOUNTER
Last Appointment:  2/8/2023  Future Appointments   Date Time Provider Zara Cordoba   8/15/2023  1:00 PM Ramo Corral  [address]

## 2023-09-29 RX ORDER — ESOMEPRAZOLE MAGNESIUM 40 MG/1
CAPSULE, DELAYED RELEASE ORAL
Qty: 90 CAPSULE | Refills: 3 | Status: SHIPPED | OUTPATIENT
Start: 2023-09-29

## 2023-10-03 ENCOUNTER — TELEPHONE (OUTPATIENT)
Dept: FAMILY MEDICINE CLINIC | Age: 68
End: 2023-10-03

## 2023-10-03 NOTE — TELEPHONE ENCOUNTER
Called Essie, Friday 10.13.23 theres an opening at 11:30 I was going to use. If that is still open when she calls back then we can use that.  If not next available is in November
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
## 2024-02-26 RX ORDER — PAROXETINE HYDROCHLORIDE 20 MG/1
20 TABLET, FILM COATED ORAL DAILY
Qty: 90 TABLET | Refills: 3 | Status: SHIPPED | OUTPATIENT
Start: 2024-02-26

## 2024-02-26 RX ORDER — ATORVASTATIN CALCIUM 40 MG/1
TABLET, FILM COATED ORAL
Qty: 90 TABLET | Refills: 3 | Status: SHIPPED | OUTPATIENT
Start: 2024-02-26

## 2024-10-22 RX ORDER — ATORVASTATIN CALCIUM 40 MG/1
TABLET, FILM COATED ORAL
Qty: 100 TABLET | Refills: 2 | Status: SHIPPED | OUTPATIENT
Start: 2024-10-22

## 2024-10-22 NOTE — TELEPHONE ENCOUNTER
Name of Medication(s) Requested:  Requested Prescriptions     Pending Prescriptions Disp Refills    atorvastatin (LIPITOR) 40 MG tablet [Pharmacy Med Name: Atorvastatin Calcium 40 MG Oral Tablet] 100 tablet 2     Sig: TAKE 1 TABLET BY MOUTH DAILY       Medication is on current medication list Yes    Dosage and directions were verified? Yes    Quantity verified: 90 day supply     Pharmacy Verified?  Yes    Last Appointment:  2/8/2023    Future appts:  No future appointments.     (If no appt send self scheduling link. .REFILLAPPT)  Scheduling request sent?     [x] Yes  [] No    Does patient need updated?  [] Yes  [x] No

## 2024-11-29 NOTE — TELEPHONE ENCOUNTER
There is already a pt scheduled this Friday at 7:15. Tammy Mayo will need to be rescheduled for a different Friday. Please pay attention when you are scheduling patients. Elisabet

## 2025-01-04 RX ORDER — PAROXETINE 20 MG/1
20 TABLET, FILM COATED ORAL DAILY
Qty: 90 TABLET | Refills: 3 | OUTPATIENT
Start: 2025-01-04

## 2025-01-04 NOTE — TELEPHONE ENCOUNTER
Name of Medication(s) Requested:  Requested Prescriptions     Pending Prescriptions Disp Refills    PARoxetine (PAXIL) 20 MG tablet [Pharmacy Med Name: PARoxetine HCl 20 MG Oral Tablet] 90 tablet 3     Sig: TAKE 1 TABLET BY MOUTH IN THE  MORNING       You have not seen Essie since 2/8/23. Patient did not respond to SCVNGR request sent in October to schedule a follow up. Will decline refill and will send patient another message to schedule an appointment.

## 2025-06-30 RX ORDER — ATORVASTATIN CALCIUM 40 MG/1
40 TABLET, FILM COATED ORAL DAILY
Qty: 100 TABLET | Refills: 2 | OUTPATIENT
Start: 2025-06-30

## 2025-06-30 NOTE — TELEPHONE ENCOUNTER
Last Appointment:  2/8/2023  No future appointments.       Patient has not seen Dr Mendes since 2/8/23.